# Patient Record
Sex: FEMALE | Race: WHITE | URBAN - METROPOLITAN AREA
[De-identification: names, ages, dates, MRNs, and addresses within clinical notes are randomized per-mention and may not be internally consistent; named-entity substitution may affect disease eponyms.]

---

## 2020-06-18 ENCOUNTER — OFFICE VISIT (OUTPATIENT)
Dept: URBAN - METROPOLITAN AREA CLINIC 115 | Facility: CLINIC | Age: 65
End: 2020-06-18
Payer: COMMERCIAL

## 2020-06-18 DIAGNOSIS — R19.7 DIARRHEA: ICD-10-CM

## 2020-06-18 DIAGNOSIS — Z86.010 PERSONAL HISTORY OF COLONIC POLYPS: ICD-10-CM

## 2020-06-18 DIAGNOSIS — K52.832 LYMPHOCYTIC COLITIS: ICD-10-CM

## 2020-06-18 PROCEDURE — 1036F TOBACCO NON-USER: CPT | Performed by: INTERNAL MEDICINE

## 2020-06-18 PROCEDURE — G8427 DOCREV CUR MEDS BY ELIG CLIN: HCPCS | Performed by: INTERNAL MEDICINE

## 2020-06-18 PROCEDURE — 99214 OFFICE O/P EST MOD 30 MIN: CPT | Performed by: INTERNAL MEDICINE

## 2020-06-18 PROCEDURE — G8420 CALC BMI NORM PARAMETERS: HCPCS | Performed by: INTERNAL MEDICINE

## 2020-06-18 RX ORDER — MESALAMINE 1.2 G/1
TAKE 1 TABLET BY MOUTH EVERY DAY TABLET, DELAYED RELEASE ORAL
Qty: 90 | Refills: 3 | Status: ACTIVE | COMMUNITY
Start: 2019-02-21

## 2020-06-18 RX ORDER — BALSALAZIDE DISODIUM 750 MG/1
CAPSULE ORAL
Qty: 0 | Refills: 0 | Status: ACTIVE | COMMUNITY
Start: 1900-01-01

## 2020-06-18 RX ORDER — DICYCLOMINE HYDROCHLORIDE 10 MG/1
1 TABLET CAPSULE ORAL THREE TIMES A DAY
Qty: 90 | Refills: 1 | OUTPATIENT
Start: 2020-06-18 | End: 2020-08-17

## 2020-06-18 RX ORDER — FLUTICASONE PROPIONATE 50 UG/1
SPRAY 1 - 2 SPRAYS (50 - 100 MCG) IN EACH NOSTRIL BY INTRANASAL ROUTE ONCE DAILY SPRAY, METERED NASAL 1
Qty: 1 | Refills: 0 | Status: ACTIVE | COMMUNITY
Start: 1900-01-01

## 2020-06-18 NOTE — HPI-TODAY'S VISIT:
Patient has occasional abdominal cramping. She continues to take Balsalazide, and Bentyl 3 tablets a day. She has been off of Budesonide for the past 1 year. She states soft cheeses trigger her symptoms. Denies any diarrhea or rectal bleeding.

## 2020-06-19 LAB
A/G RATIO: 2
ALBUMIN: 4.7
ALKALINE PHOSPHATASE: 68
ALT (SGPT): 11
AST (SGOT): 18
BILIRUBIN, TOTAL: 0.3
BUN/CREATININE RATIO: 18
BUN: 16
CALCIUM: 9.6
CARBON DIOXIDE, TOTAL: 25
CHLORIDE: 106
CREATININE: 0.9
EGFR IF AFRICN AM: 78
EGFR IF NONAFRICN AM: 68
GLOBULIN, TOTAL: 2.3
GLUCOSE: 91
HEMATOCRIT: 38.9
HEMOGLOBIN: 12.8
MCH: 29.6
MCHC: 32.9
MCV: 90
NRBC: (no result)
PLATELETS: 271
POTASSIUM: 5.4
PROTEIN, TOTAL: 7
RBC: 4.33
RDW: 14.5
SODIUM: 143
WBC: 4.9

## 2020-09-17 ENCOUNTER — WEB ENCOUNTER (OUTPATIENT)
Dept: URBAN - METROPOLITAN AREA CLINIC 115 | Facility: CLINIC | Age: 65
End: 2020-09-17

## 2020-09-17 ENCOUNTER — OFFICE VISIT (OUTPATIENT)
Dept: URBAN - METROPOLITAN AREA CLINIC 115 | Facility: CLINIC | Age: 65
End: 2020-09-17
Payer: COMMERCIAL

## 2020-09-17 DIAGNOSIS — Z86.010 PERSONAL HISTORY OF COLONIC POLYPS: ICD-10-CM

## 2020-09-17 DIAGNOSIS — R10.84 ABDOMINAL PAIN, GENERALIZED: ICD-10-CM

## 2020-09-17 DIAGNOSIS — R19.7 DIARRHEA: ICD-10-CM

## 2020-09-17 DIAGNOSIS — K52.832 LYMPHOCYTIC COLITIS: ICD-10-CM

## 2020-09-17 PROCEDURE — 1036F TOBACCO NON-USER: CPT | Performed by: INTERNAL MEDICINE

## 2020-09-17 PROCEDURE — G8420 CALC BMI NORM PARAMETERS: HCPCS | Performed by: INTERNAL MEDICINE

## 2020-09-17 PROCEDURE — 99214 OFFICE O/P EST MOD 30 MIN: CPT | Performed by: INTERNAL MEDICINE

## 2020-09-17 PROCEDURE — G8427 DOCREV CUR MEDS BY ELIG CLIN: HCPCS | Performed by: INTERNAL MEDICINE

## 2020-09-17 PROCEDURE — 3017F COLORECTAL CA SCREEN DOC REV: CPT | Performed by: INTERNAL MEDICINE

## 2020-09-17 RX ORDER — DICYCLOMINE HYDROCHLORIDE 10 MG/1
1 TABLET CAPSULE ORAL THREE TIMES A DAY
Qty: 270 TABLET | Refills: 1 | OUTPATIENT
Start: 2020-09-17 | End: 2021-03-16

## 2020-09-17 RX ORDER — MESALAMINE 1.2 G/1
TAKE 1 TABLET BY MOUTH EVERY DAY TABLET, DELAYED RELEASE ORAL
Qty: 90 | Refills: 3 | Status: ACTIVE | COMMUNITY
Start: 2019-02-21

## 2020-09-17 RX ORDER — BALSALAZIDE DISODIUM 750 MG/1
1 CAPSULE CAPSULE ORAL THREE TIMES A DAY
Qty: 270 CAPSULE | Refills: 2 | OUTPATIENT
Start: 2020-09-17 | End: 2021-06-14

## 2020-09-17 RX ORDER — BALSALAZIDE DISODIUM 750 MG/1
CAPSULE ORAL
Qty: 0 | Refills: 0 | Status: ACTIVE | COMMUNITY
Start: 1900-01-01

## 2020-09-17 RX ORDER — FLUTICASONE PROPIONATE 50 UG/1
SPRAY 1 - 2 SPRAYS (50 - 100 MCG) IN EACH NOSTRIL BY INTRANASAL ROUTE ONCE DAILY SPRAY, METERED NASAL 1
Qty: 1 | Refills: 0 | Status: ACTIVE | COMMUNITY
Start: 1900-01-01

## 2020-09-17 NOTE — HPI-TODAY'S VISIT:
06/18/2020 Patient has occasional abdominal cramping. She continues to take Balsalazide, and Bentyl 3 tablets a day. She has been off of Budesonide for the past 1 year. She states soft cheeses trigger her symptoms. Denies any diarrhea or rectal bleeding.  09/17/2020  Colonoscopy on 09/27/2019 was normal. Biopsies showed no signs of lymphocytic colitis.  Patient presents for a follow up office visit. Patient states she was doing well the past 3 months. She had an episode of diarrhea on Monday. She has been consuming milk and dairy products very minimally. She reports improvement on Bentyl, with occasional aching feeling in the stomach. She continued to take Balsalazide, and just ran out yesterday.

## 2020-09-17 NOTE — HPI-OTHER HISTORIES
The patient is a 63 year old /White female, who presents on referral from Dakota Brice MD, for a colonoscopy. The patient had a previous colonoscopy approximately 5 years ago. The patient reports the risk factors for colon cancer of a history of "colon polyps".  There is no recent history of rectal bleeding. The patient also reports abdominal pain and bloating. The abdominal pain has been present for months and is described as mild in severity. It is poorly localized. The pain is described as intermittent and crampy and non-radiating.     03/17/2016 She c/o gas and cramping. She was prescribed bentyl 10mg po tid by PCP. Previous colonoscopy showed 5 adenemous polyps in the colon. No heartburn/acid reflux. Takes calcium and multivitamins. No gi bleeding, fever, chills, nausea. No jaundice of anemia. No FH of colon cancer or polyps.  1/5/17 c/o dirrhea from past 6 weeks. recently had left knee repalcement. also c/o stomach cramps. no rectal bleeding. she finished abx 2 weeks ago.  03/02/2017 Colonoscopy done on 04/25/16 was normal with internal hemorrhoids. Stool studies done on 01/06/17 were negative for infectious pathogens. Patient says her diarrhea has improved but is not completely resolved. She also complains of excess gas and gurgling noises in her stomach. Denies family history of colon polyps and NSAID use.  03/30/2017 Colonoscopy done on 03/16/17 was normal. Biopsies showed lymphocytic colitis. Patient says she has a history of taking pain medication due to knee replacement surgery. She says she is now having 2-3 loose bowel movements a day. No rectal bleeding. Symptoms improved on Levsin.  06/29/2017 Patient says her diarrhea has improved. She is currently on 9mg Entocort therapy qd. Denies NSAID use, abdominal pain, bloating and GI bleeding. No joint pains.  09/28/2017 Patient's diarrhea has improved with occasional diarrhea which she believes may be from dairy products. She is currently on two capsules of Entocort qd and Lialda. Denies joint/back pains and NSAID use. No rectal bleeding.  02/21/18 c/o diarrhea, sx not under , having diarhea upto 3 times a day. no gi bleeeding. was on clindamycin. no weight loss.  Patient states she has had a flare up.States that symptoms were exacerbated after she had some goat cheese. She is still taking Lialda. Patient denies any abdominal pain.  Aymptoms not under control, having diarrhea now. No GI bleeding.  03/22/2018 Patient is feeling better. Diarrhea has improved. No melena or abdominal pain. No bleeding, or joint pains. Patient is still on Lialda one a day and Entocort 9 mg. Patient denies abdominal pain. Patient just had a physical. PCP  is Mitzy Alcazar.  05/17/18 Denies loose bowels and diarrhea, abdominal pain, bloating, and rectal bleeding. States she's taking Entocort po bid. Patient denies taking aspirin. Denies family history of Celiac disease. Symptoms under control.  08/16/2018 Colonoscopy showed 1 polyp in the rectum and small hemorrhoids. Biopsy of the polyp was benign.  No loose bowels, states she is still taking the medication. States she is taking Mesalamine po qd, and Budesonide. Denies family history of Celiac disease. States cheese exacerbates her symptoms. Patient does not eat much bread. Symptoms improved, denies abdominal pain.  12/20/18 States symptoms improved on medication. Currently taking Entocort. Patient is not on gluten free diet. States she has been taking Lialda daily po qd. No family history of Crohn's or UC. No melena or hematochezia.  03/07/2019 Patient states she was doing better, but then went off the medication and symptoms started again, not under control. She is taking mesalamine once a day. She states mesalamine alone without Budesonide, does not help her symptoms. Still complains of diarrhea. States Budesonide helps her symptoms but it affects her skin. She denies any family history of colitis, Crohn's or Celiac disease. She is negative for Celiac disease. Denies any melena or hematochezia. She has not changed eating habits. No joints or back pain. She deneis waking up with red eyes.  5/23/2019 Patient denies any more issues with diarrhea. She is currently on Budesonide and Balsalazide. She denies any abdominal pain and bloating. She has had skin breaking due to Budesonide. She states she takes some milk and dairy products. She has noticed that blue cheese and feta aggravate her symptoms. She denies any melena or hematochezia. Symptoms under control now, diarrhea improved.  8/22/2019 Labs done in 11/2018 showed TTG negative.  Patient states she is feeling better, symptoms improved. She took Budesonide and stopped it in June. She states she is currently on Balasalazide po tid. She denies any NSAIDs use. She denies consumption of diet drinks. She states she has occasional flare ups of diarrhea with aching lower abdominal pain. She states the pain is usually random. Denies any melena or hematochezia. Denies any rectal bleeding.  10/17/2019 Colonoscopy done on 09/27/2019 showed normal mucosa, internal hemorrhoids noted.  Patient states she is doing well. No symptoms now, symptoms improved. She is currently on Balsalazide po tid, started taking it recently. She states she is having knee replacement in December. Denies any heartburn or acid reflux. Denies any melena or hematochezia.

## 2020-09-18 LAB
A/G RATIO: 1.8
ALBUMIN: 4.7
ALKALINE PHOSPHATASE: 65
ALT (SGPT): 16
AST (SGOT): 23
BILIRUBIN, TOTAL: 0.3
BUN/CREATININE RATIO: 18
BUN: 18
CALCIUM: 10
CARBON DIOXIDE, TOTAL: 25
CHLORIDE: 101
CREATININE: 1.01
EGFR IF AFRICN AM: 68
EGFR IF NONAFRICN AM: 59
GLOBULIN, TOTAL: 2.6
GLUCOSE: 93
HEMATOCRIT: 41.7
HEMOGLOBIN: 13.6
MCH: 29.4
MCHC: 32.6
MCV: 90
NRBC: (no result)
PLATELETS: 310
POTASSIUM: 5.1
PROTEIN, TOTAL: 7.3
RBC: 4.63
RDW: 13.6
SODIUM: 142
WBC: 4.8

## 2021-01-04 ENCOUNTER — WEB ENCOUNTER (OUTPATIENT)
Dept: URBAN - METROPOLITAN AREA CLINIC 115 | Facility: CLINIC | Age: 66
End: 2021-01-04

## 2021-01-11 ENCOUNTER — TELEPHONE ENCOUNTER (OUTPATIENT)
Dept: URBAN - METROPOLITAN AREA CLINIC 82 | Facility: CLINIC | Age: 66
End: 2021-01-11

## 2021-01-11 RX ORDER — BUDESONIDE 3 MG/1
1 CAPSULES CAPSULE ORAL THREE TIMES A DAY
Qty: 270 CAPSULE | Refills: 1 | OUTPATIENT
Start: 2021-01-11 | End: 2021-07-10

## 2021-01-21 ENCOUNTER — OFFICE VISIT (OUTPATIENT)
Dept: URBAN - METROPOLITAN AREA CLINIC 115 | Facility: CLINIC | Age: 66
End: 2021-01-21
Payer: COMMERCIAL

## 2021-01-21 DIAGNOSIS — Z86.010 PERSONAL HISTORY OF COLONIC POLYPS: ICD-10-CM

## 2021-01-21 DIAGNOSIS — K52.832 LYMPHOCYTIC COLITIS: ICD-10-CM

## 2021-01-21 DIAGNOSIS — R19.7 DIARRHEA: ICD-10-CM

## 2021-01-21 DIAGNOSIS — R10.84 ABDOMINAL PAIN, GENERALIZED: ICD-10-CM

## 2021-01-21 PROCEDURE — G9903 PT SCRN TBCO ID AS NON USER: HCPCS | Performed by: INTERNAL MEDICINE

## 2021-01-21 PROCEDURE — G8417 CALC BMI ABV UP PARAM F/U: HCPCS | Performed by: INTERNAL MEDICINE

## 2021-01-21 PROCEDURE — 99214 OFFICE O/P EST MOD 30 MIN: CPT | Performed by: INTERNAL MEDICINE

## 2021-01-21 PROCEDURE — G8427 DOCREV CUR MEDS BY ELIG CLIN: HCPCS | Performed by: INTERNAL MEDICINE

## 2021-01-21 RX ORDER — BALSALAZIDE DISODIUM 750 MG/1
1 CAPSULE CAPSULE ORAL THREE TIMES A DAY
Qty: 270 CAPSULE | Refills: 2 | COMMUNITY
Start: 2020-09-17 | End: 2021-06-14

## 2021-01-21 RX ORDER — BUDESONIDE 3 MG/1
1 CAPSULES CAPSULE ORAL THREE TIMES A DAY
Qty: 270 CAPSULE | Refills: 1 | COMMUNITY
Start: 2021-01-11 | End: 2021-07-10

## 2021-01-21 RX ORDER — MESALAMINE 1.2 G/1
TAKE 1 TABLET BY MOUTH EVERY DAY TABLET, DELAYED RELEASE ORAL
Qty: 90 | Refills: 3 | COMMUNITY
Start: 2019-02-21

## 2021-01-21 RX ORDER — BALSALAZIDE DISODIUM 750 MG/1
1 CAPSULE CAPSULE ORAL THREE TIMES A DAY
Qty: 270 CAPSULE | Refills: 2 | OUTPATIENT
Start: 2021-01-21 | End: 2021-10-18

## 2021-01-21 RX ORDER — DICYCLOMINE HYDROCHLORIDE 10 MG/1
1 TABLET CAPSULE ORAL THREE TIMES A DAY
Qty: 270 TABLET | Refills: 1 | COMMUNITY
Start: 2020-09-17 | End: 2021-03-16

## 2021-01-21 RX ORDER — BALSALAZIDE DISODIUM 750 MG/1
CAPSULE ORAL
Qty: 0 | Refills: 0 | COMMUNITY
Start: 1900-01-01

## 2021-01-21 RX ORDER — FLUTICASONE PROPIONATE 50 UG/1
SPRAY 1 - 2 SPRAYS (50 - 100 MCG) IN EACH NOSTRIL BY INTRANASAL ROUTE ONCE DAILY SPRAY, METERED NASAL 1
Qty: 1 | Refills: 0 | COMMUNITY
Start: 1900-01-01

## 2021-01-21 NOTE — HPI-TODAY'S VISIT:
2020 Patient has occasional abdominal cramping. She continues to take Balsalazide, and Bentyl 3 tablets a day. She has been off of Budesonide for the past 1 year. She states soft cheeses trigger her symptoms. Denies any diarrhea or rectal bleeding.  2020  Colonoscopy on 2019 was normal. Biopsies showed no signs of lymphocytic colitis.  Patient presents for a follow up office visit. Patient states she was doing well the past 3 months. She had an episode of diarrhea on Monday. She has been consuming milk and dairy products very minimally. She reports improvement on Bentyl, with occasional aching feeling in the stomach. She continued to take Balsalazide, and just ran out yesterday.   2021 Patient presents for a f/u office visit. She says her diarrhea and other symptoms have improved significantly, back to normal. Her symptoms subsided without steroid medication, which she did not take because it was 6 months . She still takes balsalazide 3 times a day, has been taking it for about over a year now. She stopped entocort about 2 years ago. She says the cause of her recent flare up was unknown, possibly stress or food. Her  was recently diagnosed with prostate cancer which contributed to her stress at the time. Denies heartburn or acid reflux symptoms. She mostly avoids milk and dairy products, she is aware of which foods cause her irritation. Entocort caused her skin tears and discoloration in her leg previously, improved since she stopped.

## 2021-03-18 ENCOUNTER — OFFICE VISIT (OUTPATIENT)
Dept: URBAN - METROPOLITAN AREA CLINIC 115 | Facility: CLINIC | Age: 66
End: 2021-03-18

## 2021-07-28 ENCOUNTER — WEB ENCOUNTER (OUTPATIENT)
Dept: URBAN - METROPOLITAN AREA CLINIC 82 | Facility: CLINIC | Age: 66
End: 2021-07-28

## 2021-07-29 ENCOUNTER — OFFICE VISIT (OUTPATIENT)
Dept: URBAN - METROPOLITAN AREA CLINIC 115 | Facility: CLINIC | Age: 66
End: 2021-07-29
Payer: COMMERCIAL

## 2021-07-29 DIAGNOSIS — R19.7 DIARRHEA: ICD-10-CM

## 2021-07-29 DIAGNOSIS — Z86.010 PERSONAL HISTORY OF COLONIC POLYPS: ICD-10-CM

## 2021-07-29 DIAGNOSIS — K52.832 LYMPHOCYTIC COLITIS: ICD-10-CM

## 2021-07-29 DIAGNOSIS — R10.84 ABDOMINAL PAIN, GENERALIZED: ICD-10-CM

## 2021-07-29 PROCEDURE — 99214 OFFICE O/P EST MOD 30 MIN: CPT | Performed by: INTERNAL MEDICINE

## 2021-07-29 PROCEDURE — G8417 CALC BMI ABV UP PARAM F/U: HCPCS | Performed by: INTERNAL MEDICINE

## 2021-07-29 PROCEDURE — G9903 PT SCRN TBCO ID AS NON USER: HCPCS | Performed by: INTERNAL MEDICINE

## 2021-07-29 PROCEDURE — G8427 DOCREV CUR MEDS BY ELIG CLIN: HCPCS | Performed by: INTERNAL MEDICINE

## 2021-07-29 RX ORDER — MESALAMINE 1.2 G/1
TAKE 1 TABLET BY MOUTH EVERY DAY TABLET, DELAYED RELEASE ORAL
Qty: 90 | Refills: 3 | Status: ACTIVE | COMMUNITY
Start: 2019-02-21

## 2021-07-29 RX ORDER — BALSALAZIDE DISODIUM 750 MG/1
CAPSULE ORAL
Qty: 0 | Refills: 0 | Status: ACTIVE | COMMUNITY
Start: 1900-01-01

## 2021-07-29 RX ORDER — FLUTICASONE PROPIONATE 50 UG/1
SPRAY 1 - 2 SPRAYS (50 - 100 MCG) IN EACH NOSTRIL BY INTRANASAL ROUTE ONCE DAILY SPRAY, METERED NASAL 1
Qty: 1 | Refills: 0 | Status: ACTIVE | COMMUNITY
Start: 1900-01-01

## 2021-07-29 RX ORDER — BALSALAZIDE DISODIUM 750 MG/1
1 CAPSULE CAPSULE ORAL THREE TIMES A DAY
Qty: 270 CAPSULE | Refills: 2 | Status: ACTIVE | COMMUNITY
Start: 2021-01-21 | End: 2021-10-18

## 2021-07-29 NOTE — HPI-TODAY'S VISIT:
2020 Patient has occasional abdominal cramping. She continues to take Balsalazide, and Bentyl 3 tablets a day. She has been off of Budesonide for the past 1 year. She states soft cheeses trigger her symptoms. Denies any diarrhea or rectal bleeding.  2020  Colonoscopy on 2019 was normal. Biopsies showed no signs of lymphocytic colitis.  Patient presents for a follow up office visit. Patient states she was doing well the past 3 months. She had an episode of diarrhea on Monday. She has been consuming milk and dairy products very minimally. She reports improvement on Bentyl, with occasional aching feeling in the stomach. She continued to take Balsalazide, and just ran out yesterday.   2021 Patient presents for a f/u office visit. She says her diarrhea and other symptoms have improved significantly, back to normal. Her symptoms subsided without steroid medication, which she did not take because it was 6 months . She still takes balsalazide 3 times a day, has been taking it for about over a year now. She stopped entocort about 2 years ago. She says the cause of her recent flare up was unknown, possibly stress or food. Her  was recently diagnosed with prostate cancer which contributed to her stress at the time. Denies heartburn or acid reflux symptoms. She mostly avoids milk and dairy products, she is aware of which foods cause her irritation. Entocort caused her skin tears and discoloration in her leg previously, improved since she stopped.   2021 Patient presents for a follow up office visit. Patient reports her diarrhea occurs infrequently now, often triggered by salad and dairy products. She currently takes Balsalazide 750mg twice a day and Dicyclomine as needed. She is not using Entocort. Overall, she is doing well.

## 2021-07-30 LAB
A/G RATIO: 1.8
ALBUMIN: 4.6
ALKALINE PHOSPHATASE: 71
ALT (SGPT): 16
AST (SGOT): 23
BASO (ABSOLUTE): 0.1
BASOS: 1
BILIRUBIN, TOTAL: 0.3
BUN/CREATININE RATIO: 21
BUN: 19
CALCIUM: 10.1
CARBON DIOXIDE, TOTAL: 25
CHLORIDE: 103
CREATININE: 0.91
EGFR IF AFRICN AM: 77
EGFR IF NONAFRICN AM: 66
EOS (ABSOLUTE): 0.3
EOS: 5
GLOBULIN, TOTAL: 2.5
GLUCOSE: 91
HEMATOCRIT: 42.6
HEMATOLOGY COMMENTS:: (no result)
HEMOGLOBIN: 13.7
IMMATURE CELLS: (no result)
IMMATURE GRANS (ABS): 0
IMMATURE GRANULOCYTES: 1
LYMPHS (ABSOLUTE): 1.9
LYMPHS: 33
MCH: 30.4
MCHC: 32.2
MCV: 95
MONOCYTES(ABSOLUTE): 0.6
MONOCYTES: 9
NEUTROPHILS (ABSOLUTE): 3.1
NEUTROPHILS: 51
NRBC: (no result)
PLATELETS: 307
POTASSIUM: 5.8
PROTEIN, TOTAL: 7.1
RBC: 4.5
RDW: 13
SODIUM: 141
WBC: 5.9

## 2021-08-05 ENCOUNTER — TELEPHONE ENCOUNTER (OUTPATIENT)
Dept: URBAN - METROPOLITAN AREA CLINIC 23 | Facility: CLINIC | Age: 66
End: 2021-08-05

## 2021-08-05 RX ORDER — BALSALAZIDE DISODIUM 750 MG/1
1 CAPSULE CAPSULE ORAL THREE TIMES A DAY
Qty: 270 CAPSULE | Refills: 2 | OUTPATIENT
Start: 2021-08-05 | End: 2022-05-02

## 2021-10-28 ENCOUNTER — WEB ENCOUNTER (OUTPATIENT)
Dept: URBAN - METROPOLITAN AREA CLINIC 115 | Facility: CLINIC | Age: 66
End: 2021-10-28

## 2021-10-28 RX ORDER — DICYCLOMINE HYDROCHLORIDE 10 MG/1
1 TABLET CAPSULE ORAL THREE TIMES A DAY
Qty: 270 TABLET | Refills: 1 | OUTPATIENT
Start: 2021-10-28 | End: 2022-04-26

## 2021-11-04 ENCOUNTER — P2P PATIENT RECORD (OUTPATIENT)
Age: 66
End: 2021-11-04

## 2022-01-14 ENCOUNTER — P2P PATIENT RECORD (OUTPATIENT)
Age: 67
End: 2022-01-14

## 2022-01-20 ENCOUNTER — OFFICE VISIT (OUTPATIENT)
Dept: URBAN - METROPOLITAN AREA CLINIC 115 | Facility: CLINIC | Age: 67
End: 2022-01-20
Payer: COMMERCIAL

## 2022-01-20 DIAGNOSIS — R19.7 DIARRHEA: ICD-10-CM

## 2022-01-20 DIAGNOSIS — R10.84 ABDOMINAL PAIN, GENERALIZED: ICD-10-CM

## 2022-01-20 DIAGNOSIS — Z86.010 PERSONAL HISTORY OF COLONIC POLYPS: ICD-10-CM

## 2022-01-20 DIAGNOSIS — K52.832 LYMPHOCYTIC COLITIS: ICD-10-CM

## 2022-01-20 PROCEDURE — G9903 PT SCRN TBCO ID AS NON USER: HCPCS | Performed by: INTERNAL MEDICINE

## 2022-01-20 PROCEDURE — 99214 OFFICE O/P EST MOD 30 MIN: CPT | Performed by: INTERNAL MEDICINE

## 2022-01-20 PROCEDURE — G8427 DOCREV CUR MEDS BY ELIG CLIN: HCPCS | Performed by: INTERNAL MEDICINE

## 2022-01-20 PROCEDURE — G8417 CALC BMI ABV UP PARAM F/U: HCPCS | Performed by: INTERNAL MEDICINE

## 2022-01-20 RX ORDER — DICYCLOMINE HYDROCHLORIDE 10 MG/1
1 TABLET CAPSULE ORAL THREE TIMES A DAY
Qty: 270 TABLET | Refills: 1 | Status: ACTIVE | COMMUNITY
Start: 2021-10-28 | End: 2022-04-26

## 2022-01-20 RX ORDER — BALSALAZIDE DISODIUM 750 MG/1
1 CAPSULE CAPSULE ORAL THREE TIMES A DAY
Qty: 270 CAPSULE | Refills: 2 | Status: ACTIVE | COMMUNITY
Start: 2021-08-05 | End: 2022-05-02

## 2022-01-20 RX ORDER — MESALAMINE 1.2 G/1
TAKE 1 TABLET BY MOUTH EVERY DAY TABLET, DELAYED RELEASE ORAL
Qty: 90 | Refills: 3 | Status: ACTIVE | COMMUNITY
Start: 2019-02-21

## 2022-01-20 RX ORDER — BALSALAZIDE DISODIUM 750 MG/1
CAPSULE ORAL
Qty: 0 | Refills: 0 | Status: ACTIVE | COMMUNITY
Start: 1900-01-01

## 2022-01-20 RX ORDER — FLUTICASONE PROPIONATE 50 UG/1
SPRAY 1 - 2 SPRAYS (50 - 100 MCG) IN EACH NOSTRIL BY INTRANASAL ROUTE ONCE DAILY SPRAY, METERED NASAL 1
Qty: 1 | Refills: 0 | Status: ACTIVE | COMMUNITY
Start: 1900-01-01

## 2022-01-20 NOTE — HPI-TODAY'S VISIT:
2020 Patient has occasional abdominal cramping. She continues to take Balsalazide, and Bentyl 3 tablets a day. She has been off of Budesonide for the past 1 year. She states soft cheeses trigger her symptoms. Denies any diarrhea or rectal bleeding.  2020  Colonoscopy on 2019 was normal. Biopsies showed no signs of lymphocytic colitis.  Patient presents for a follow up office visit. Patient states she was doing well the past 3 months. She had an episode of diarrhea on Monday. She has been consuming milk and dairy products very minimally. She reports improvement on Bentyl, with occasional aching feeling in the stomach. She continued to take Balsalazide, and just ran out yesterday.   2021 Patient presents for a f/u office visit. She says her diarrhea and other symptoms have improved significantly, back to normal. Her symptoms subsided without steroid medication, which she did not take because it was 6 months . She still takes balsalazide 3 times a day, has been taking it for about over a year now. She stopped entocort about 2 years ago. She says the cause of her recent flare up was unknown, possibly stress or food. Her  was recently diagnosed with prostate cancer which contributed to her stress at the time. Denies heartburn or acid reflux symptoms. She mostly avoids milk and dairy products, she is aware of which foods cause her irritation. Entocort caused her skin tears and discoloration in her leg previously, improved since she stopped.   2021 Patient presents for a follow up office visit. Patient reports her diarrhea occurs infrequently now, often triggered by salad and dairy products. She currently takes Balsalazide 750mg twice a day and Dicyclomine as needed. She is not using Entocort. Overall, she is doing well.   2022 Labs from 2021 were normal. Patient reports she is doing well on Balsalazide 750mg twice a day. Diarrhea only presents occasionally now. She has been off of Entocort for over 1.5 years. She admits joint pain in her hands and knee from arthritis. She denies any heartburn or acid reflux.

## 2022-01-21 LAB
A/G RATIO: 2
ALBUMIN: 4.4
ALKALINE PHOSPHATASE: 73
ALT (SGPT): 11
AST (SGOT): 15
BASO (ABSOLUTE): 0
BASOS: 0
BILIRUBIN, TOTAL: 0.3
BUN/CREATININE RATIO: 17
BUN: 14
CALCIUM: 9.3
CARBON DIOXIDE, TOTAL: 26
CHLORIDE: 101
CREATININE: 0.84
EGFR IF AFRICN AM: 84
EGFR IF NONAFRICN AM: 73
EOS (ABSOLUTE): 0.4
EOS: 5
GLOBULIN, TOTAL: 2.2
GLUCOSE: 83
HEMATOCRIT: 41.8
HEMATOLOGY COMMENTS:: (no result)
HEMOGLOBIN: 13.5
IMMATURE CELLS: (no result)
IMMATURE GRANS (ABS): 0
IMMATURE GRANULOCYTES: 1
LYMPHS (ABSOLUTE): 1.9
LYMPHS: 25
MCH: 29.5
MCHC: 32.3
MCV: 91
MONOCYTES(ABSOLUTE): 0.8
MONOCYTES: 10
NEUTROPHILS (ABSOLUTE): 4.4
NEUTROPHILS: 59
NRBC: (no result)
PLATELETS: 299
POTASSIUM: 5.1
PROTEIN, TOTAL: 6.6
RBC: 4.58
RDW: 12.4
SODIUM: 141
WBC: 7.5

## 2022-05-02 ENCOUNTER — WEB ENCOUNTER (OUTPATIENT)
Dept: URBAN - METROPOLITAN AREA CLINIC 115 | Facility: CLINIC | Age: 67
End: 2022-05-02

## 2022-05-05 ENCOUNTER — WEB ENCOUNTER (OUTPATIENT)
Dept: URBAN - METROPOLITAN AREA CLINIC 115 | Facility: CLINIC | Age: 67
End: 2022-05-05

## 2022-05-05 ENCOUNTER — OFFICE VISIT (OUTPATIENT)
Dept: URBAN - METROPOLITAN AREA CLINIC 115 | Facility: CLINIC | Age: 67
End: 2022-05-05
Payer: COMMERCIAL

## 2022-05-05 DIAGNOSIS — R19.7 DIARRHEA: ICD-10-CM

## 2022-05-05 DIAGNOSIS — Z86.010 PERSONAL HISTORY OF COLONIC POLYPS: ICD-10-CM

## 2022-05-05 DIAGNOSIS — R10.84 ABDOMINAL PAIN, GENERALIZED: ICD-10-CM

## 2022-05-05 DIAGNOSIS — K52.832 LYMPHOCYTIC COLITIS: ICD-10-CM

## 2022-05-05 PROCEDURE — G8417 CALC BMI ABV UP PARAM F/U: HCPCS | Performed by: INTERNAL MEDICINE

## 2022-05-05 PROCEDURE — G9903 PT SCRN TBCO ID AS NON USER: HCPCS | Performed by: INTERNAL MEDICINE

## 2022-05-05 PROCEDURE — G8427 DOCREV CUR MEDS BY ELIG CLIN: HCPCS | Performed by: INTERNAL MEDICINE

## 2022-05-05 PROCEDURE — 99214 OFFICE O/P EST MOD 30 MIN: CPT | Performed by: INTERNAL MEDICINE

## 2022-05-05 RX ORDER — MESALAMINE 1.2 G/1
TAKE 1 TABLET BY MOUTH EVERY DAY TABLET, DELAYED RELEASE ORAL
Qty: 90 | Refills: 3 | Status: ACTIVE | COMMUNITY
Start: 2019-02-21

## 2022-05-05 RX ORDER — FLUTICASONE PROPIONATE 50 UG/1
SPRAY 1 - 2 SPRAYS (50 - 100 MCG) IN EACH NOSTRIL BY INTRANASAL ROUTE ONCE DAILY SPRAY, METERED NASAL 1
Qty: 1 | Refills: 0 | Status: ACTIVE | COMMUNITY
Start: 1900-01-01

## 2022-05-05 RX ORDER — HYOSCYAMINE SULFATE 0.12 MG/1
1 TABLET AS NEEDED TABLET ORAL
Qty: 90 | Refills: 1 | OUTPATIENT
Start: 2022-05-05 | End: 2022-07-04

## 2022-05-05 RX ORDER — BALSALAZIDE DISODIUM 750 MG/1
CAPSULE ORAL
Qty: 0 | Refills: 0 | Status: ACTIVE | COMMUNITY
Start: 1900-01-01

## 2022-05-05 RX ORDER — AZATHIOPRINE 50 MG/1
AS DIRECTED TABLET ORAL ONCE A DAY
Qty: 90 | Refills: 1 | OUTPATIENT

## 2022-05-05 NOTE — HPI-TODAY'S VISIT:
2020 Patient has occasional abdominal cramping. She continues to take Balsalazide, and Bentyl 3 tablets a day. She has been off of Budesonide for the past 1 year. She states soft cheeses trigger her symptoms. Denies any diarrhea or rectal bleeding.  2020  Colonoscopy on 2019 was normal. Biopsies showed no signs of lymphocytic colitis. Patient presents for a follow up office visit. Patient states she was doing well the past 3 months. She had an episode of diarrhea on Monday. She has been consuming milk and dairy products very minimally. She reports improvement on Bentyl, with occasional aching feeling in the stomach. She continued to take Balsalazide, and just ran out yesterday.   2021 Patient presents for a f/u office visit. She says her diarrhea and other symptoms have improved significantly, back to normal. Her symptoms subsided without steroid medication, which she did not take because it was 6 months . She still takes balsalazide 3 times a day, has been taking it for about over a year now. She stopped entocort about 2 years ago. She says the cause of her recent flare up was unknown, possibly stress or food. Her  was recently diagnosed with prostate cancer which contributed to her stress at the time. Denies heartburn or acid reflux symptoms. She mostly avoids milk and dairy products, she is aware of which foods cause her irritation. Entocort caused her skin tears and discoloration in her leg previously, improved since she stopped.   2021 Patient presents for a follow up office visit. Patient reports her diarrhea occurs infrequently now, often triggered by salad and dairy products. She currently takes Balsalazide 750mg twice a day and Dicyclomine as needed. She is not using Entocort. Overall, she is doing well.   2022 Labs from 2021 were normal. Patient reports she is doing well on Balsalazide 750mg twice a day. Diarrhea only presents occasionally now. She has been off of Entocort for over 1.5 years. She admits joint pain in her hands and knee from arthritis. She denies any heartburn or acid reflux.   2022 Patient presents for follow up. Labs on 2022 showed normal kidney function. Patient reports "aching" discomfort in lower abdomen and lower back for the past month. Back pain is newly onset. She had two episodes of nocturnal cramping, improved after having bowel movement. Diarrhea presents occasionally now, significantly improved on Budesonide. She takes Dicyclomine tid, Balsalazide 750mg bid. Labs with her PCP 1 month ago showed low vitamin B12. She has been taking tumeric, has not started vitamin B12 injections yet.

## 2022-05-14 ENCOUNTER — WEB ENCOUNTER (OUTPATIENT)
Dept: URBAN - METROPOLITAN AREA CLINIC 115 | Facility: CLINIC | Age: 67
End: 2022-05-14

## 2022-05-17 ENCOUNTER — TELEPHONE ENCOUNTER (OUTPATIENT)
Dept: URBAN - METROPOLITAN AREA CLINIC 115 | Facility: CLINIC | Age: 67
End: 2022-05-17

## 2022-05-17 RX ORDER — HYOSCYAMINE SULFATE 0.12 MG/1
1 TABLET AS NEEDED TABLET ORAL
Qty: 270 TABLET | Refills: 0

## 2022-05-17 RX ORDER — AZATHIOPRINE 50 MG/1
AS DIRECTED TABLET ORAL ONCE A DAY
Qty: 90 | Refills: 0
End: 2022-08-15

## 2022-06-08 ENCOUNTER — OFFICE VISIT (OUTPATIENT)
Dept: URBAN - METROPOLITAN AREA CLINIC 82 | Facility: CLINIC | Age: 67
End: 2022-06-08

## 2022-07-07 ENCOUNTER — LAB OUTSIDE AN ENCOUNTER (OUTPATIENT)
Dept: URBAN - METROPOLITAN AREA CLINIC 115 | Facility: CLINIC | Age: 67
End: 2022-07-07

## 2022-07-07 ENCOUNTER — OFFICE VISIT (OUTPATIENT)
Dept: URBAN - METROPOLITAN AREA CLINIC 115 | Facility: CLINIC | Age: 67
End: 2022-07-07
Payer: COMMERCIAL

## 2022-07-07 VITALS
BODY MASS INDEX: 32.98 KG/M2 | HEIGHT: 66 IN | TEMPERATURE: 97.2 F | DIASTOLIC BLOOD PRESSURE: 72 MMHG | HEART RATE: 82 BPM | RESPIRATION RATE: 18 BRPM | SYSTOLIC BLOOD PRESSURE: 120 MMHG | WEIGHT: 205.2 LBS

## 2022-07-07 DIAGNOSIS — Z86.010 PERSONAL HISTORY OF COLONIC POLYPS: ICD-10-CM

## 2022-07-07 DIAGNOSIS — R10.84 ABDOMINAL PAIN, GENERALIZED: ICD-10-CM

## 2022-07-07 DIAGNOSIS — R19.7 DIARRHEA: ICD-10-CM

## 2022-07-07 DIAGNOSIS — K52.832 LYMPHOCYTIC COLITIS: ICD-10-CM

## 2022-07-07 PROCEDURE — G8417 CALC BMI ABV UP PARAM F/U: HCPCS | Performed by: INTERNAL MEDICINE

## 2022-07-07 PROCEDURE — G9903 PT SCRN TBCO ID AS NON USER: HCPCS | Performed by: INTERNAL MEDICINE

## 2022-07-07 PROCEDURE — G8427 DOCREV CUR MEDS BY ELIG CLIN: HCPCS | Performed by: INTERNAL MEDICINE

## 2022-07-07 PROCEDURE — 99214 OFFICE O/P EST MOD 30 MIN: CPT | Performed by: INTERNAL MEDICINE

## 2022-07-07 RX ORDER — AZATHIOPRINE 50 MG/1
AS DIRECTED TABLET ORAL ONCE A DAY
Qty: 90 | Refills: 0 | Status: ACTIVE | COMMUNITY
End: 2022-08-15

## 2022-07-07 RX ORDER — MESALAMINE 1.2 G/1
TAKE 1 TABLET BY MOUTH EVERY DAY TABLET, DELAYED RELEASE ORAL
Qty: 90 | Refills: 3 | Status: ACTIVE | COMMUNITY
Start: 2019-02-21

## 2022-07-07 RX ORDER — BALSALAZIDE DISODIUM 750 MG/1
1 CAPSULE CAPSULE ORAL THREE TIMES A DAY
Qty: 270 CAPSULE | Refills: 2 | OUTPATIENT
Start: 2022-07-07 | End: 2023-04-03

## 2022-07-07 RX ORDER — FLUTICASONE PROPIONATE 50 UG/1
SPRAY 1 - 2 SPRAYS (50 - 100 MCG) IN EACH NOSTRIL BY INTRANASAL ROUTE ONCE DAILY SPRAY, METERED NASAL 1
Qty: 1 | Refills: 0 | Status: ACTIVE | COMMUNITY
Start: 1900-01-01

## 2022-07-07 RX ORDER — HYOSCYAMINE SULFATE 0.12 MG/1
1 TABLET AS NEEDED TABLET ORAL
Qty: 270 TABLET | Refills: 0 | Status: ACTIVE | COMMUNITY

## 2022-07-07 RX ORDER — BALSALAZIDE DISODIUM 750 MG/1
CAPSULE ORAL
Qty: 0 | Refills: 0 | Status: ACTIVE | COMMUNITY
Start: 1900-01-01

## 2022-07-07 NOTE — HPI-TODAY'S VISIT:
2020 Patient has occasional abdominal cramping. She continues to take Balsalazide, and Bentyl 3 tablets a day. She has been off of Budesonide for the past 1 year. She states soft cheeses trigger her symptoms. Denies any diarrhea or rectal bleeding.  2020  Colonoscopy on 2019 was normal. Biopsies showed no signs of lymphocytic colitis. Patient presents for a follow up office visit. Patient states she was doing well the past 3 months. She had an episode of diarrhea on Monday. She has been consuming milk and dairy products very minimally. She reports improvement on Bentyl, with occasional aching feeling in the stomach. She continued to take Balsalazide, and just ran out yesterday.   2021 Patient presents for a f/u office visit. She says her diarrhea and other symptoms have improved significantly, back to normal. Her symptoms subsided without steroid medication, which she did not take because it was 6 months . She still takes balsalazide 3 times a day, has been taking it for about over a year now. She stopped entocort about 2 years ago. She says the cause of her recent flare up was unknown, possibly stress or food. Her  was recently diagnosed with prostate cancer which contributed to her stress at the time. Denies heartburn or acid reflux symptoms. She mostly avoids milk and dairy products, she is aware of which foods cause her irritation. Entocort caused her skin tears and discoloration in her leg previously, improved since she stopped.   2021 Patient presents for a follow up office visit. Patient reports her diarrhea occurs infrequently now, often triggered by salad and dairy products. She currently takes Balsalazide 750mg twice a day and Dicyclomine as needed. She is not using Entocort. Overall, she is doing well.   2022 Labs from 2021 were normal. Patient reports she is doing well on Balsalazide 750mg twice a day. Diarrhea only presents occasionally now. She has been off of Entocort for over 1.5 years. She admits joint pain in her hands and knee from arthritis. She denies any heartburn or acid reflux.   2022 Patient presents for follow up. Labs on 2022 showed normal kidney function. Patient reports "aching" discomfort in lower abdomen and lower back for the past month. Back pain is newly onset. She had two episodes of nocturnal cramping, improved after having bowel movement. Diarrhea presents occasionally now, significantly improved on Budesonide. She takes Dicyclomine tid, Balsalazide 750mg bid. Labs with her PCP 1 month ago showed low vitamin B12. She has been taking tumeric, has not started vitamin B12 injections yet.  2022 Patient presents for a follow up for lower back and abdominal pain. Patient denies any significant symptoms and she states that she still takes hyoscymine and she will start azathioprine. Patient states that she is also still on the balsalazide. Patient denies any back pain and joint pains. She states that the only time that she has pain in her back is when she bends over and then straightens out. Yet, she states that she is over all feeling a lot better and her pain has improved.

## 2022-07-08 LAB
A/G RATIO: 1.9
ALBUMIN: 4.7
ALKALINE PHOSPHATASE: 77
ALT (SGPT): 11
AST (SGOT): 17
BASO (ABSOLUTE): 0
BASOS: 1
BILIRUBIN, TOTAL: 0.2
BUN/CREATININE RATIO: 17
BUN: 15
CALCIUM: 9.2
CARBON DIOXIDE, TOTAL: 24
CHLORIDE: 105
CREATININE: 0.89
EGFR: 71
EOS (ABSOLUTE): 0.4
EOS: 6
GLOBULIN, TOTAL: 2.5
GLUCOSE: 97
HEMATOCRIT: 41.9
HEMATOLOGY COMMENTS:: (no result)
HEMOGLOBIN: 13.3
IMMATURE CELLS: (no result)
IMMATURE GRANS (ABS): 0
IMMATURE GRANULOCYTES: 0
LYMPHS (ABSOLUTE): 2.1
LYMPHS: 34
MCH: 30
MCHC: 31.7
MCV: 95
MONOCYTES(ABSOLUTE): 0.5
MONOCYTES: 9
NEUTROPHILS (ABSOLUTE): 3.2
NEUTROPHILS: 50
NRBC: (no result)
PLATELETS: 262
POTASSIUM: 5.1
PROTEIN, TOTAL: 7.2
RBC: 4.43
RDW: 13.4
SODIUM: 141
WBC: 6.2

## 2022-08-04 ENCOUNTER — OFFICE VISIT (OUTPATIENT)
Dept: URBAN - METROPOLITAN AREA CLINIC 115 | Facility: CLINIC | Age: 67
End: 2022-08-04

## 2022-08-04 ENCOUNTER — TELEPHONE ENCOUNTER (OUTPATIENT)
Dept: URBAN - METROPOLITAN AREA CLINIC 115 | Facility: CLINIC | Age: 67
End: 2022-08-04

## 2022-08-04 RX ORDER — HYOSCYAMINE SULFATE 0.12 MG/1
1 TABLET AS NEEDED TABLET ORAL THREE TIMES A DAY
Qty: 270 TABLET | Refills: 1 | OUTPATIENT
Start: 2022-08-05 | End: 2023-01-31

## 2022-08-05 LAB
A/G RATIO: 1.9
ALBUMIN: 4.3
ALKALINE PHOSPHATASE: 60
ALT (SGPT): 11
AST (SGOT): 18
BILIRUBIN, TOTAL: 0.5
BUN/CREATININE RATIO: (no result)
BUN: 20
CALCIUM: 9
CARBON DIOXIDE, TOTAL: 26
CHLORIDE: 104
CREATININE: 0.96
EGFR: 65
GLOBULIN, TOTAL: 2.3
GLUCOSE: 102
POTASSIUM: 4.6
PROTEIN, TOTAL: 6.6
SODIUM: 137

## 2022-08-17 ENCOUNTER — WEB ENCOUNTER (OUTPATIENT)
Dept: URBAN - METROPOLITAN AREA CLINIC 115 | Facility: CLINIC | Age: 67
End: 2022-08-17

## 2022-10-13 ENCOUNTER — LAB OUTSIDE AN ENCOUNTER (OUTPATIENT)
Dept: URBAN - METROPOLITAN AREA CLINIC 115 | Facility: CLINIC | Age: 67
End: 2022-10-13

## 2022-10-13 ENCOUNTER — OFFICE VISIT (OUTPATIENT)
Dept: URBAN - METROPOLITAN AREA CLINIC 115 | Facility: CLINIC | Age: 67
End: 2022-10-13
Payer: COMMERCIAL

## 2022-10-13 VITALS
BODY MASS INDEX: 32.69 KG/M2 | SYSTOLIC BLOOD PRESSURE: 136 MMHG | TEMPERATURE: 97.5 F | HEART RATE: 80 BPM | HEIGHT: 66 IN | WEIGHT: 203.4 LBS | DIASTOLIC BLOOD PRESSURE: 82 MMHG

## 2022-10-13 DIAGNOSIS — R10.84 ABDOMINAL PAIN, GENERALIZED: ICD-10-CM

## 2022-10-13 DIAGNOSIS — Z86.010 PERSONAL HISTORY OF COLONIC POLYPS: ICD-10-CM

## 2022-10-13 DIAGNOSIS — R19.7 DIARRHEA: ICD-10-CM

## 2022-10-13 DIAGNOSIS — K52.832 LYMPHOCYTIC COLITIS: ICD-10-CM

## 2022-10-13 PROCEDURE — 99214 OFFICE O/P EST MOD 30 MIN: CPT | Performed by: INTERNAL MEDICINE

## 2022-10-13 PROCEDURE — G8417 CALC BMI ABV UP PARAM F/U: HCPCS | Performed by: INTERNAL MEDICINE

## 2022-10-13 PROCEDURE — G8427 DOCREV CUR MEDS BY ELIG CLIN: HCPCS | Performed by: INTERNAL MEDICINE

## 2022-10-13 PROCEDURE — G9903 PT SCRN TBCO ID AS NON USER: HCPCS | Performed by: INTERNAL MEDICINE

## 2022-10-13 RX ORDER — HYOSCYAMINE SULFATE 0.12 MG/1
1 TABLET AS NEEDED TABLET ORAL THREE TIMES A DAY
Qty: 270 TABLET | Refills: 1 | OUTPATIENT
Start: 2022-10-13 | End: 2023-04-11

## 2022-10-13 RX ORDER — SODIUM PICOSULFATE, MAGNESIUM OXIDE, AND ANHYDROUS CITRIC ACID 10; 3.5; 12 MG/160ML; G/160ML; G/160ML
160 ML LIQUID ORAL
Qty: 320 MILLILITER | Refills: 0 | OUTPATIENT
Start: 2022-10-13 | End: 2022-10-14

## 2022-10-13 RX ORDER — FLUTICASONE PROPIONATE 50 UG/1
SPRAY 1 - 2 SPRAYS (50 - 100 MCG) IN EACH NOSTRIL BY INTRANASAL ROUTE ONCE DAILY SPRAY, METERED NASAL 1
Qty: 1 | Refills: 0 | Status: ACTIVE | COMMUNITY
Start: 1900-01-01

## 2022-10-13 RX ORDER — BALSALAZIDE DISODIUM 750 MG/1
1 CAPSULE CAPSULE ORAL THREE TIMES A DAY
Qty: 270 CAPSULE | Refills: 2 | OUTPATIENT
Start: 2022-10-13 | End: 2023-07-10

## 2022-10-13 RX ORDER — BALSALAZIDE DISODIUM 750 MG/1
1 CAPSULE CAPSULE ORAL THREE TIMES A DAY
Qty: 270 CAPSULE | Refills: 2 | Status: ACTIVE | COMMUNITY
Start: 2022-07-07 | End: 2023-04-03

## 2022-10-13 RX ORDER — BALSALAZIDE DISODIUM 750 MG/1
CAPSULE ORAL
Qty: 0 | Refills: 0 | Status: DISCONTINUED | COMMUNITY
Start: 1900-01-01

## 2022-10-13 RX ORDER — HYOSCYAMINE SULFATE 0.12 MG/1
1 TABLET AS NEEDED TABLET ORAL THREE TIMES A DAY
Qty: 270 TABLET | Refills: 1 | Status: ACTIVE | COMMUNITY
Start: 2022-08-05 | End: 2023-01-31

## 2022-10-13 NOTE — HPI-TODAY'S VISIT:
2020 Patient has occasional abdominal cramping. She continues to take Balsalazide, and Bentyl 3 tablets a day. She has been off of Budesonide for the past 1 year. She states soft cheeses trigger her symptoms. Denies any diarrhea or rectal bleeding.  2020  Colonoscopy on 2019 was normal. Biopsies showed no signs of lymphocytic colitis. Patient presents for a follow up office visit. Patient states she was doing well the past 3 months. She had an episode of diarrhea on Monday. She has been consuming milk and dairy products very minimally. She reports improvement on Bentyl, with occasional aching feeling in the stomach. She continued to take Balsalazide, and just ran out yesterday.   2021 Patient presents for a f/u office visit. She says her diarrhea and other symptoms have improved significantly, back to normal. Her symptoms subsided without steroid medication, which she did not take because it was 6 months . She still takes balsalazide 3 times a day, has been taking it for about over a year now. She stopped entocort about 2 years ago. She says the cause of her recent flare up was unknown, possibly stress or food. Her  was recently diagnosed with prostate cancer which contributed to her stress at the time. Denies heartburn or acid reflux symptoms. She mostly avoids milk and dairy products, she is aware of which foods cause her irritation. Entocort caused her skin tears and discoloration in her leg previously, improved since she stopped.   2021 Patient presents for a follow up office visit. Patient reports her diarrhea occurs infrequently now, often triggered by salad and dairy products. She currently takes Balsalazide 750mg twice a day and Dicyclomine as needed. She is not using Entocort. Overall, she is doing well.   2022 Labs from 2021 were normal. Patient reports she is doing well on Balsalazide 750mg twice a day. Diarrhea only presents occasionally now. She has been off of Entocort for over 1.5 years. She admits joint pain in her hands and knee from arthritis. She denies any heartburn or acid reflux.   2022 Patient presents for follow up. Labs on 2022 showed normal kidney function. Patient reports "aching" discomfort in lower abdomen and lower back for the past month. Back pain is newly onset. She had two episodes of nocturnal cramping, improved after having bowel movement. Diarrhea presents occasionally now, significantly improved on Budesonide. She takes Dicyclomine tid, Balsalazide 750mg bid. Labs with her PCP 1 month ago showed low vitamin B12. She has been taking tumeric, has not started vitamin B12 injections yet.  2022 Patient presents for a follow up for lower back and abdominal pain. Patient denies any significant symptoms and she states that she still takes hyoscymine and she will start azathioprine. Patient states that she is also still on the balsalazide. Patient denies any back pain and joint pains. She states that the only time that she has pain in her back is when she bends over and then straightens out. Yet, she states that she is over all feeling a lot better and her pain has improved.  10/13/2022 Patient presents for lower back pain and abdominal pain. Patient states that she feels fine, but that she could not tolerate the azathioprine due to having nausea. She states that she is still taking the balsalazide and hyoscyamine. Patient states that she does not have loose bowels anymore. She denies joint pain and back pain. Patient states that her last colonoscopy was 3 years ago. Patient states that she has arthritis. Patient states that she has been getting b12 shots and that she got blood work done about 3-4 weeks ago.

## 2022-11-11 ENCOUNTER — OFFICE VISIT (OUTPATIENT)
Dept: URBAN - METROPOLITAN AREA SURGERY CENTER 13 | Facility: SURGERY CENTER | Age: 67
End: 2022-11-11
Payer: COMMERCIAL

## 2022-11-11 ENCOUNTER — WEB ENCOUNTER (OUTPATIENT)
Dept: URBAN - METROPOLITAN AREA CLINIC 115 | Facility: CLINIC | Age: 67
End: 2022-11-11

## 2022-11-11 DIAGNOSIS — K52.832 COLITIS, UNSPEC (558.9): ICD-10-CM

## 2022-11-11 DIAGNOSIS — K63.89 BACTERIAL OVERGROWTH SYNDROME: ICD-10-CM

## 2022-11-11 PROCEDURE — 45380 COLONOSCOPY AND BIOPSY: CPT | Performed by: INTERNAL MEDICINE

## 2022-11-11 PROCEDURE — G8907 PT DOC NO EVENTS ON DISCHARG: HCPCS | Performed by: INTERNAL MEDICINE

## 2022-12-08 ENCOUNTER — OFFICE VISIT (OUTPATIENT)
Dept: URBAN - METROPOLITAN AREA CLINIC 115 | Facility: CLINIC | Age: 67
End: 2022-12-08
Payer: COMMERCIAL

## 2022-12-08 VITALS — TEMPERATURE: 97.8 F | WEIGHT: 198.8 LBS | HEIGHT: 66 IN | BODY MASS INDEX: 31.95 KG/M2

## 2022-12-08 DIAGNOSIS — Z86.010 PERSONAL HISTORY OF COLONIC POLYPS: ICD-10-CM

## 2022-12-08 DIAGNOSIS — K52.832 LYMPHOCYTIC COLITIS: ICD-10-CM

## 2022-12-08 DIAGNOSIS — R10.84 ABDOMINAL PAIN, GENERALIZED: ICD-10-CM

## 2022-12-08 DIAGNOSIS — R19.7 DIARRHEA: ICD-10-CM

## 2022-12-08 PROCEDURE — G8427 DOCREV CUR MEDS BY ELIG CLIN: HCPCS | Performed by: INTERNAL MEDICINE

## 2022-12-08 PROCEDURE — G9903 PT SCRN TBCO ID AS NON USER: HCPCS | Performed by: INTERNAL MEDICINE

## 2022-12-08 PROCEDURE — 99214 OFFICE O/P EST MOD 30 MIN: CPT | Performed by: INTERNAL MEDICINE

## 2022-12-08 PROCEDURE — G8417 CALC BMI ABV UP PARAM F/U: HCPCS | Performed by: INTERNAL MEDICINE

## 2022-12-08 RX ORDER — HYOSCYAMINE SULFATE 0.12 MG/1
1 TABLET AS NEEDED TABLET ORAL THREE TIMES A DAY
Qty: 270 TABLET | Refills: 1 | Status: ACTIVE | COMMUNITY
Start: 2022-10-13 | End: 2023-04-11

## 2022-12-08 RX ORDER — BALSALAZIDE DISODIUM 750 MG/1
1 CAPSULE CAPSULE ORAL THREE TIMES A DAY
Qty: 270 CAPSULE | Refills: 2 | Status: ACTIVE | COMMUNITY
Start: 2022-10-13 | End: 2023-07-10

## 2022-12-08 RX ORDER — BALSALAZIDE DISODIUM 750 MG/1
1 CAPSULE CAPSULE ORAL THREE TIMES A DAY
Qty: 270 CAPSULE | Refills: 2 | Status: ACTIVE | COMMUNITY
Start: 2022-07-07 | End: 2023-04-03

## 2022-12-08 RX ORDER — HYOSCYAMINE SULFATE 0.12 MG/1
1 TABLET AS NEEDED TABLET ORAL THREE TIMES A DAY
Qty: 270 TABLET | Refills: 1 | Status: ACTIVE | COMMUNITY
Start: 2022-08-05 | End: 2023-01-31

## 2022-12-08 RX ORDER — FLUTICASONE PROPIONATE 50 UG/1
SPRAY 1 - 2 SPRAYS (50 - 100 MCG) IN EACH NOSTRIL BY INTRANASAL ROUTE ONCE DAILY SPRAY, METERED NASAL 1
Qty: 1 | Refills: 0 | Status: ACTIVE | COMMUNITY
Start: 1900-01-01

## 2022-12-08 NOTE — HPI-TODAY'S VISIT:
2020 Patient has occasional abdominal cramping. She continues to take Balsalazide, and Bentyl 3 tablets a day. She has been off of Budesonide for the past 1 year. She states soft cheeses trigger her symptoms. Denies any diarrhea or rectal bleeding.  2020  Colonoscopy on 2019 was normal. Biopsies showed no signs of lymphocytic colitis. Patient presents for a follow up office visit. Patient states she was doing well the past 3 months. She had an episode of diarrhea on Monday. She has been consuming milk and dairy products very minimally. She reports improvement on Bentyl, with occasional aching feeling in the stomach. She continued to take Balsalazide, and just ran out yesterday.   2021 Patient presents for a f/u office visit. She says her diarrhea and other symptoms have improved significantly, back to normal. Her symptoms subsided without steroid medication, which she did not take because it was 6 months . She still takes balsalazide 3 times a day, has been taking it for about over a year now. She stopped entocort about 2 years ago. She says the cause of her recent flare up was unknown, possibly stress or food. Her  was recently diagnosed with prostate cancer which contributed to her stress at the time. Denies heartburn or acid reflux symptoms. She mostly avoids milk and dairy products, she is aware of which foods cause her irritation. Entocort caused her skin tears and discoloration in her leg previously, improved since she stopped.   2021 Patient presents for a follow up office visit. Patient reports her diarrhea occurs infrequently now, often triggered by salad and dairy products. She currently takes Balsalazide 750mg twice a day and Dicyclomine as needed. She is not using Entocort. Overall, she is doing well.   2022 Labs from 2021 were normal. Patient reports she is doing well on Balsalazide 750mg twice a day. Diarrhea only presents occasionally now. She has been off of Entocort for over 1.5 years. She admits joint pain in her hands and knee from arthritis. She denies any heartburn or acid reflux.   2022 Patient presents for follow up. Labs on 2022 showed normal kidney function. Patient reports "aching" discomfort in lower abdomen and lower back for the past month. Back pain is newly onset. She had two episodes of nocturnal cramping, improved after having bowel movement. Diarrhea presents occasionally now, significantly improved on Budesonide. She takes Dicyclomine tid, Balsalazide 750mg bid. Labs with her PCP 1 month ago showed low vitamin B12. She has been taking tumeric, has not started vitamin B12 injections yet.  2022 Patient presents for a follow up for lower back and abdominal pain. Patient denies any significant symptoms and she states that she still takes hyoscymine and she will start azathioprine. Patient states that she is also still on the balsalazide. Patient denies any back pain and joint pains. She states that the only time that she has pain in her back is when she bends over and then straightens out. Yet, she states that she is over all feeling a lot better and her pain has improved.  10/13/2022 Patient presents for lower back pain and abdominal pain. Patient states that she feels fine, but that she could not tolerate the azathioprine due to having nausea. She states that she is still taking the balsalazide and hyoscyamine. Patient states that she does not have loose bowels anymore. She denies joint pain and back pain. Patient states that her last colonoscopy was 3 years ago. Patient states that she has arthritis. Patient states that she has been getting b12 shots and that she got blood work done about 3-4 weeks ago.  2022 Patient presents for a follow up on colonoscopy. Patient states that she still takes budesonide three times a day. She states that she is unsure as to when was the last time she had a flare up in her colon. She states that her symptoms got better after switching dicyclomine to hyoscyamine. She denies issues with bloating or constipation and denies dysphagia.

## 2023-01-12 ENCOUNTER — OFFICE VISIT (OUTPATIENT)
Dept: URBAN - METROPOLITAN AREA CLINIC 115 | Facility: CLINIC | Age: 68
End: 2023-01-12

## 2023-03-09 ENCOUNTER — OFFICE VISIT (OUTPATIENT)
Dept: URBAN - METROPOLITAN AREA CLINIC 115 | Facility: CLINIC | Age: 68
End: 2023-03-09
Payer: COMMERCIAL

## 2023-03-09 VITALS
SYSTOLIC BLOOD PRESSURE: 128 MMHG | WEIGHT: 199 LBS | HEART RATE: 40 BPM | DIASTOLIC BLOOD PRESSURE: 79 MMHG | TEMPERATURE: 97.6 F | HEIGHT: 66 IN | BODY MASS INDEX: 31.98 KG/M2

## 2023-03-09 DIAGNOSIS — K52.832 LYMPHOCYTIC COLITIS: ICD-10-CM

## 2023-03-09 DIAGNOSIS — R10.84 ABDOMINAL PAIN, GENERALIZED: ICD-10-CM

## 2023-03-09 DIAGNOSIS — Z86.010 PERSONAL HISTORY OF COLONIC POLYPS: ICD-10-CM

## 2023-03-09 DIAGNOSIS — R19.7 DIARRHEA: ICD-10-CM

## 2023-03-09 PROCEDURE — G9903 PT SCRN TBCO ID AS NON USER: HCPCS | Performed by: INTERNAL MEDICINE

## 2023-03-09 PROCEDURE — G8417 CALC BMI ABV UP PARAM F/U: HCPCS | Performed by: INTERNAL MEDICINE

## 2023-03-09 PROCEDURE — 99214 OFFICE O/P EST MOD 30 MIN: CPT | Performed by: INTERNAL MEDICINE

## 2023-03-09 PROCEDURE — G8427 DOCREV CUR MEDS BY ELIG CLIN: HCPCS | Performed by: INTERNAL MEDICINE

## 2023-03-09 RX ORDER — BALSALAZIDE DISODIUM 750 MG/1
1 CAPSULE CAPSULE ORAL THREE TIMES A DAY
Qty: 270 CAPSULE | Refills: 2 | Status: ACTIVE | COMMUNITY
Start: 2022-07-07 | End: 2023-04-03

## 2023-03-09 RX ORDER — FLUTICASONE PROPIONATE 50 UG/1
SPRAY 1 - 2 SPRAYS (50 - 100 MCG) IN EACH NOSTRIL BY INTRANASAL ROUTE ONCE DAILY SPRAY, METERED NASAL 1
Qty: 1 | Refills: 0 | Status: ACTIVE | COMMUNITY
Start: 1900-01-01

## 2023-03-09 RX ORDER — HYOSCYAMINE SULFATE 0.12 MG/1
1 TABLET AS NEEDED TABLET ORAL THREE TIMES A DAY
Qty: 270 TABLET | Refills: 1 | Status: ACTIVE | COMMUNITY
Start: 2022-10-13 | End: 2023-04-11

## 2023-03-09 RX ORDER — BALSALAZIDE DISODIUM 750 MG/1
1 CAPSULE CAPSULE ORAL THREE TIMES A DAY
Qty: 270 CAPSULE | Refills: 2 | Status: ACTIVE | COMMUNITY
Start: 2022-10-13 | End: 2023-07-10

## 2023-03-09 RX ORDER — CHOLESTYRAMINE 4 G/9G
1 PACKET MIXED WITH WATER OR NON-CARBONATED DRINK POWDER, FOR SUSPENSION ORAL ONCE A DAY
Qty: 90 | Refills: 1 | OUTPATIENT
Start: 2023-03-09

## 2023-03-09 RX ORDER — HYOSCYAMINE SULFATE 0.12 MG/1
1 TABLET AS NEEDED TABLET ORAL THREE TIMES A DAY
Qty: 270 TABLET | Refills: 1 | OUTPATIENT
Start: 2023-03-09 | End: 2023-09-05

## 2023-03-09 NOTE — HPI-TODAY'S VISIT:
2020 Patient has occasional abdominal cramping. She continues to take Balsalazide, and Bentyl 3 tablets a day. She has been off of Budesonide for the past 1 year. She states soft cheeses trigger her symptoms. Denies any diarrhea or rectal bleeding.  2020  Colonoscopy on 2019 was normal. Biopsies showed no signs of lymphocytic colitis. Patient presents for a follow up office visit. Patient states she was doing well the past 3 months. She had an episode of diarrhea on Monday. She has been consuming milk and dairy products very minimally. She reports improvement on Bentyl, with occasional aching feeling in the stomach. She continued to take Balsalazide, and just ran out yesterday.   2021 Patient presents for a f/u office visit. She says her diarrhea and other symptoms have improved significantly, back to normal. Her symptoms subsided without steroid medication, which she did not take because it was 6 months . She still takes balsalazide 3 times a day, has been taking it for about over a year now. She stopped entocort about 2 years ago. She says the cause of her recent flare up was unknown, possibly stress or food. Her  was recently diagnosed with prostate cancer which contributed to her stress at the time. Denies heartburn or acid reflux symptoms. She mostly avoids milk and dairy products, she is aware of which foods cause her irritation. Entocort caused her skin tears and discoloration in her leg previously, improved since she stopped.   2021 Patient presents for a follow up office visit. Patient reports her diarrhea occurs infrequently now, often triggered by salad and dairy products. She currently takes Balsalazide 750mg twice a day and Dicyclomine as needed. She is not using Entocort. Overall, she is doing well.   2022 Labs from 2021 were normal. Patient reports she is doing well on Balsalazide 750mg twice a day. Diarrhea only presents occasionally now. She has been off of Entocort for over 1.5 years. She admits joint pain in her hands and knee from arthritis. She denies any heartburn or acid reflux.   2022 Patient presents for follow up. Labs on 2022 showed normal kidney function. Patient reports "aching" discomfort in lower abdomen and lower back for the past month. Back pain is newly onset. She had two episodes of nocturnal cramping, improved after having bowel movement. Diarrhea presents occasionally now, significantly improved on Budesonide. She takes Dicyclomine tid, Balsalazide 750mg bid. Labs with her PCP 1 month ago showed low vitamin B12. She has been taking tumeric, has not started vitamin B12 injections yet.  2022 Patient presents for a follow up for lower back and abdominal pain. Patient denies any significant symptoms and she states that she still takes hyoscymine and she will start azathioprine. Patient states that she is also still on the balsalazide. Patient denies any back pain and joint pains. She states that the only time that she has pain in her back is when she bends over and then straightens out. Yet, she states that she is over all feeling a lot better and her pain has improved.  10/13/2022 Patient presents for lower back pain and abdominal pain. Patient states that she feels fine, but that she could not tolerate the azathioprine due to having nausea. She states that she is still taking the balsalazide and hyoscyamine. Patient states that she does not have loose bowels anymore. She denies joint pain and back pain. Patient states that her last colonoscopy was 3 years ago. Patient states that she has arthritis. Patient states that she has been getting b12 shots and that she got blood work done about 3-4 weeks ago.  2022 Patient presents for a follow up on colonoscopy. Patient states that she still takes budesonide three times a day. She states that she is unsure as to when was the last time she had a flare up in her colon. She states that her symptoms got better after switching dicyclomine to hyoscyamine. She denies issues with bloating or constipation and denies dysphagia.  3/9/2023 Patient presents for a 3 month follow up. Patient states that she has been taking the hyoscyamine and that she has not been feeling bloating or abdominal pain. She does state that she has loose bowels, but that she has not started the balsalazide. Patient states that one day she had a smoothie, only fruit and no milk, and that her flare up was very bad. Patient denies anxiety, stress, or depression. She states that the hyoscyamine has helped with the abdominal cramping and the noise.

## 2023-03-29 ENCOUNTER — WEB ENCOUNTER (OUTPATIENT)
Dept: URBAN - METROPOLITAN AREA CLINIC 115 | Facility: CLINIC | Age: 68
End: 2023-03-29

## 2023-05-11 ENCOUNTER — OFFICE VISIT (OUTPATIENT)
Dept: URBAN - METROPOLITAN AREA CLINIC 115 | Facility: CLINIC | Age: 68
End: 2023-05-11

## 2023-06-08 ENCOUNTER — OFFICE VISIT (OUTPATIENT)
Dept: URBAN - METROPOLITAN AREA CLINIC 115 | Facility: CLINIC | Age: 68
End: 2023-06-08
Payer: COMMERCIAL

## 2023-06-08 VITALS
HEIGHT: 66 IN | TEMPERATURE: 97.6 F | BODY MASS INDEX: 31.85 KG/M2 | DIASTOLIC BLOOD PRESSURE: 73 MMHG | SYSTOLIC BLOOD PRESSURE: 134 MMHG | WEIGHT: 198.2 LBS | HEART RATE: 76 BPM

## 2023-06-08 DIAGNOSIS — Z86.010 PERSONAL HISTORY OF COLONIC POLYPS: ICD-10-CM

## 2023-06-08 DIAGNOSIS — R19.7 DIARRHEA: ICD-10-CM

## 2023-06-08 DIAGNOSIS — R10.84 ABDOMINAL PAIN, GENERALIZED: ICD-10-CM

## 2023-06-08 DIAGNOSIS — K52.832 LYMPHOCYTIC COLITIS: ICD-10-CM

## 2023-06-08 PROCEDURE — G8427 DOCREV CUR MEDS BY ELIG CLIN: HCPCS | Performed by: INTERNAL MEDICINE

## 2023-06-08 PROCEDURE — G9903 PT SCRN TBCO ID AS NON USER: HCPCS | Performed by: INTERNAL MEDICINE

## 2023-06-08 PROCEDURE — G8417 CALC BMI ABV UP PARAM F/U: HCPCS | Performed by: INTERNAL MEDICINE

## 2023-06-08 PROCEDURE — 99214 OFFICE O/P EST MOD 30 MIN: CPT | Performed by: INTERNAL MEDICINE

## 2023-06-08 RX ORDER — HYOSCYAMINE SULFATE 0.12 MG/1
1 TABLET AS NEEDED TABLET ORAL THREE TIMES A DAY
Qty: 270 TABLET | Refills: 1 | Status: ACTIVE | COMMUNITY
Start: 2023-03-09 | End: 2023-09-05

## 2023-06-08 RX ORDER — CHOLESTYRAMINE 4 G/9G
1 PACKET MIXED WITH WATER OR NON-CARBONATED DRINK POWDER, FOR SUSPENSION ORAL ONCE A DAY
Qty: 90 | Refills: 1 | Status: ACTIVE | COMMUNITY
Start: 2023-03-09

## 2023-06-08 RX ORDER — FLUTICASONE PROPIONATE 50 UG/1
SPRAY 1 - 2 SPRAYS (50 - 100 MCG) IN EACH NOSTRIL BY INTRANASAL ROUTE ONCE DAILY SPRAY, METERED NASAL 1
Qty: 1 | Refills: 0 | Status: ACTIVE | COMMUNITY
Start: 1900-01-01

## 2023-06-08 RX ORDER — BALSALAZIDE DISODIUM 750 MG/1
1 CAPSULE CAPSULE ORAL THREE TIMES A DAY
Qty: 270 CAPSULE | Refills: 2 | Status: ACTIVE | COMMUNITY
Start: 2022-10-13 | End: 2023-07-10

## 2023-06-08 RX ORDER — ROSUVASTATIN CALCIUM 5 MG/1
1 TABLET TABLET, COATED ORAL ONCE A DAY
Status: ACTIVE | COMMUNITY

## 2023-06-08 NOTE — HPI-TODAY'S VISIT:
2020 Patient has occasional abdominal cramping. She continues to take Balsalazide, and Bentyl 3 tablets a day. She has been off of Budesonide for the past 1 year. She states soft cheeses trigger her symptoms. Denies any diarrhea or rectal bleeding.  2020  Colonoscopy on 2019 was normal. Biopsies showed no signs of lymphocytic colitis. Patient presents for a follow up office visit. Patient states she was doing well the past 3 months. She had an episode of diarrhea on Monday. She has been consuming milk and dairy products very minimally. She reports improvement on Bentyl, with occasional aching feeling in the stomach. She continued to take Balsalazide, and just ran out yesterday.   2021 Patient presents for a f/u office visit. She says her diarrhea and other symptoms have improved significantly, back to normal. Her symptoms subsided without steroid medication, which she did not take because it was 6 months . She still takes balsalazide 3 times a day, has been taking it for about over a year now. She stopped entocort about 2 years ago. She says the cause of her recent flare up was unknown, possibly stress or food. Her  was recently diagnosed with prostate cancer which contributed to her stress at the time. Denies heartburn or acid reflux symptoms. She mostly avoids milk and dairy products, she is aware of which foods cause her irritation. Entocort caused her skin tears and discoloration in her leg previously, improved since she stopped.   2021 Patient presents for a follow up office visit. Patient reports her diarrhea occurs infrequently now, often triggered by salad and dairy products. She currently takes Balsalazide 750mg twice a day and Dicyclomine as needed. She is not using Entocort. Overall, she is doing well.   2022 Labs from 2021 were normal. Patient reports she is doing well on Balsalazide 750mg twice a day. Diarrhea only presents occasionally now. She has been off of Entocort for over 1.5 years. She admits joint pain in her hands and knee from arthritis. She denies any heartburn or acid reflux.   2022 Patient presents for follow up. Labs on 2022 showed normal kidney function. Patient reports "aching" discomfort in lower abdomen and lower back for the past month. Back pain is newly onset. She had two episodes of nocturnal cramping, improved after having bowel movement. Diarrhea presents occasionally now, significantly improved on Budesonide. She takes Dicyclomine tid, Balsalazide 750mg bid. Labs with her PCP 1 month ago showed low vitamin B12. She has been taking tumeric, has not started vitamin B12 injections yet.  2022 Patient presents for a follow up for lower back and abdominal pain. Patient denies any significant symptoms and she states that she still takes hyoscymine and she will start azathioprine. Patient states that she is also still on the balsalazide. Patient denies any back pain and joint pains. She states that the only time that she has pain in her back is when she bends over and then straightens out. Yet, she states that she is over all feeling a lot better and her pain has improved.  10/13/2022 Patient presents for lower back pain and abdominal pain. Patient states that she feels fine, but that she could not tolerate the azathioprine due to having nausea. She states that she is still taking the balsalazide and hyoscyamine. Patient states that she does not have loose bowels anymore. She denies joint pain and back pain. Patient states that her last colonoscopy was 3 years ago. Patient states that she has arthritis. Patient states that she has been getting b12 shots and that she got blood work done about 3-4 weeks ago.  2022 Patient presents for a follow up on colonoscopy. Patient states that she still takes budesonide three times a day. She states that she is unsure as to when was the last time she had a flare up in her colon. She states that her symptoms got better after switching dicyclomine to hyoscyamine. She denies issues with bloating or constipation and denies dysphagia.  3/9/2023 Patient presents for a 3 month follow up. Patient states that she has been taking the hyoscyamine and that she has not been feeling bloating or abdominal pain. She does state that she has loose bowels, but that she has not started the balsalazide. Patient states that one day she had a smoothie, only fruit and no milk, and that her flare up was very bad. Patient denies anxiety, stress, or depression. She states that the hyoscyamine has helped with the abdominal cramping and the noise.  2023 Patient presents for a follow up. Patient states that she is not on any medication anymore and states that she feels better now. Patient states that when she was taking the questran powder, that it really helped. Patient states that she also stopped taking the balsalazide as well. She states that she only limits her diary intake. She states that she was recently put on rosuvastatin.

## 2023-07-26 ENCOUNTER — WEB ENCOUNTER (OUTPATIENT)
Dept: URBAN - METROPOLITAN AREA CLINIC 115 | Facility: CLINIC | Age: 68
End: 2023-07-26

## 2023-07-26 RX ORDER — BALSALAZIDE DISODIUM 750 MG/1
1 CAPSULE CAPSULE ORAL THREE TIMES A DAY
Qty: 270 CAPSULE | Refills: 2
Start: 2022-10-13 | End: 2024-04-22

## 2023-12-07 ENCOUNTER — OFFICE VISIT (OUTPATIENT)
Dept: URBAN - METROPOLITAN AREA CLINIC 115 | Facility: CLINIC | Age: 68
End: 2023-12-07

## 2023-12-14 ENCOUNTER — OFFICE VISIT (OUTPATIENT)
Dept: URBAN - METROPOLITAN AREA CLINIC 115 | Facility: CLINIC | Age: 68
End: 2023-12-14

## 2024-01-18 ENCOUNTER — WEB ENCOUNTER (OUTPATIENT)
Dept: URBAN - METROPOLITAN AREA CLINIC 115 | Facility: CLINIC | Age: 69
End: 2024-01-18

## 2024-01-18 ENCOUNTER — OFFICE VISIT (OUTPATIENT)
Dept: URBAN - METROPOLITAN AREA CLINIC 115 | Facility: CLINIC | Age: 69
End: 2024-01-18
Payer: COMMERCIAL

## 2024-01-18 ENCOUNTER — DASHBOARD ENCOUNTERS (OUTPATIENT)
Age: 69
End: 2024-01-18

## 2024-01-18 VITALS
TEMPERATURE: 97.5 F | HEIGHT: 66 IN | DIASTOLIC BLOOD PRESSURE: 62 MMHG | HEART RATE: 102 BPM | BODY MASS INDEX: 31.24 KG/M2 | WEIGHT: 194.4 LBS | SYSTOLIC BLOOD PRESSURE: 98 MMHG

## 2024-01-18 DIAGNOSIS — R19.7 DIARRHEA: ICD-10-CM

## 2024-01-18 DIAGNOSIS — K52.832 LYMPHOCYTIC COLITIS: ICD-10-CM

## 2024-01-18 DIAGNOSIS — R10.84 ABDOMINAL PAIN, GENERALIZED: ICD-10-CM

## 2024-01-18 DIAGNOSIS — Z86.010 PERSONAL HISTORY OF COLONIC POLYPS: ICD-10-CM

## 2024-01-18 PROBLEM — 102614006 GENERALIZED ABDOMINAL PAIN: Status: ACTIVE | Noted: 2020-09-17

## 2024-01-18 PROCEDURE — 99214 OFFICE O/P EST MOD 30 MIN: CPT | Performed by: INTERNAL MEDICINE

## 2024-01-18 RX ORDER — BALSALAZIDE DISODIUM 750 MG/1
1 CAPSULE CAPSULE ORAL THREE TIMES A DAY
Qty: 270 CAPSULE | Refills: 2 | Status: ACTIVE | COMMUNITY
Start: 2022-10-13 | End: 2024-04-22

## 2024-01-18 RX ORDER — HYOSCYAMINE SULFATE 0.12 MG/1
1 TABLET AS NEEDED TABLET ORAL THREE TIMES A DAY
Qty: 270 TABLET | Refills: 1 | OUTPATIENT
Start: 2024-01-18 | End: 2024-07-16

## 2024-01-18 RX ORDER — CHOLESTYRAMINE 4 G/9G
1 PACKET MIXED WITH WATER OR NON-CARBONATED DRINK POWDER, FOR SUSPENSION ORAL ONCE A DAY
Qty: 90 | Refills: 1 | Status: ACTIVE | COMMUNITY
Start: 2023-03-09

## 2024-01-18 RX ORDER — ROSUVASTATIN CALCIUM 5 MG/1
1 TABLET TABLET, COATED ORAL ONCE A DAY
Status: ACTIVE | COMMUNITY

## 2024-01-18 RX ORDER — FLUTICASONE PROPIONATE 50 UG/1
SPRAY 1 - 2 SPRAYS (50 - 100 MCG) IN EACH NOSTRIL BY INTRANASAL ROUTE ONCE DAILY SPRAY, METERED NASAL 1
Qty: 1 | Refills: 0 | Status: ACTIVE | COMMUNITY
Start: 1900-01-01

## 2024-01-18 NOTE — HPI-TODAY'S VISIT:
2020 Patient has occasional abdominal cramping. She continues to take Balsalazide, and Bentyl 3 tablets a day. She has been off of Budesonide for the past 1 year. She states soft cheeses trigger her symptoms. Denies any diarrhea or rectal bleeding.  2020  Colonoscopy on 2019 was normal. Biopsies showed no signs of lymphocytic colitis. Patient presents for a follow up office visit. Patient states she was doing well the past 3 months. She had an episode of diarrhea on Monday. She has been consuming milk and dairy products very minimally. She reports improvement on Bentyl, with occasional aching feeling in the stomach. She continued to take Balsalazide, and just ran out yesterday.   2021 Patient presents for a f/u office visit. She says her diarrhea and other symptoms have improved significantly, back to normal. Her symptoms subsided without steroid medication, which she did not take because it was 6 months . She still takes balsalazide 3 times a day, has been taking it for about over a year now. She stopped entocort about 2 years ago. She says the cause of her recent flare up was unknown, possibly stress or food. Her  was recently diagnosed with prostate cancer which contributed to her stress at the time. Denies heartburn or acid reflux symptoms. She mostly avoids milk and dairy products, she is aware of which foods cause her irritation. Entocort caused her skin tears and discoloration in her leg previously, improved since she stopped.   2021 Patient presents for a follow up office visit. Patient reports her diarrhea occurs infrequently now, often triggered by salad and dairy products. She currently takes Balsalazide 750mg twice a day and Dicyclomine as needed. She is not using Entocort. Overall, she is doing well.   2022 Labs from 2021 were normal. Patient reports she is doing well on Balsalazide 750mg twice a day. Diarrhea only presents occasionally now. She has been off of Entocort for over 1.5 years. She admits joint pain in her hands and knee from arthritis. She denies any heartburn or acid reflux.   2022 Patient presents for follow up. Labs on 2022 showed normal kidney function. Patient reports "aching" discomfort in lower abdomen and lower back for the past month. Back pain is newly onset. She had two episodes of nocturnal cramping, improved after having bowel movement. Diarrhea presents occasionally now, significantly improved on Budesonide. She takes Dicyclomine tid, Balsalazide 750mg bid. Labs with her PCP 1 month ago showed low vitamin B12. She has been taking tumeric, has not started vitamin B12 injections yet.  2022 Patient presents for a follow up for lower back and abdominal pain. Patient denies any significant symptoms and she states that she still takes hyoscymine and she will start azathioprine. Patient states that she is also still on the balsalazide. Patient denies any back pain and joint pains. She states that the only time that she has pain in her back is when she bends over and then straightens out. Yet, she states that she is over all feeling a lot better and her pain has improved.  10/13/2022 Patient presents for lower back pain and abdominal pain. Patient states that she feels fine, but that she could not tolerate the azathioprine due to having nausea. She states that she is still taking the balsalazide and hyoscyamine. Patient states that she does not have loose bowels anymore. She denies joint pain and back pain. Patient states that her last colonoscopy was 3 years ago. Patient states that she has arthritis. Patient states that she has been getting b12 shots and that she got blood work done about 3-4 weeks ago.  2022 Patient presents for a follow up on colonoscopy. Patient states that she still takes budesonide three times a day. She states that she is unsure as to when was the last time she had a flare up in her colon. She states that her symptoms got better after switching dicyclomine to hyoscyamine. She denies issues with bloating or constipation and denies dysphagia.  3/9/2023 Patient presents for a 3 month follow up. Patient states that she has been taking the hyoscyamine and that she has not been feeling bloating or abdominal pain. She does state that she has loose bowels, but that she has not started the balsalazide. Patient states that one day she had a smoothie, only fruit and no milk, and that her flare up was very bad. Patient denies anxiety, stress, or depression. She states that the hyoscyamine has helped with the abdominal cramping and the noise.  2023 Patient presents for a follow up. Patient states that she is not on any medication anymore and states that she feels better now. Patient states that when she was taking the questran powder, that it really helped. Patient states that she also stopped taking the balsalazide as well. She states that she only limits her diary intake. She states that she was recently put on rosuvastatin.  2024 Patient presents for a follow up. Patient states that she has only been having loose bowels a couple times and that when it occurs she has intense abdominal cramping. She is still on the questran powder, but she states that she takes it only when she has a flare up of loose bowels and abdominal pain. She is still on hyoscyamine and balsalazide. Overall, patient's symptoms have improved.

## 2024-01-23 ENCOUNTER — TELEPHONE ENCOUNTER (OUTPATIENT)
Dept: URBAN - METROPOLITAN AREA CLINIC 115 | Facility: CLINIC | Age: 69
End: 2024-01-23

## 2024-01-23 ENCOUNTER — WEB ENCOUNTER (OUTPATIENT)
Dept: URBAN - METROPOLITAN AREA CLINIC 115 | Facility: CLINIC | Age: 69
End: 2024-01-23

## 2024-01-24 ENCOUNTER — WEB ENCOUNTER (OUTPATIENT)
Dept: URBAN - METROPOLITAN AREA CLINIC 115 | Facility: CLINIC | Age: 69
End: 2024-01-24

## 2024-01-24 LAB
ALMOND (F20) IGE: <0.1
CASHEW NUT (F202) IGE: <0.1
CLASS: 0
CODFISH (F3) IGE: <0.1
COW'S MILK (F2) IGE: <0.1
EGG WHITE (F1) IGE: <0.1
HAZELNUT (F17) IGE: <0.1
PEANUT (F13) IGE: <0.1
SALMON (F41) IGE: <0.1
SCALLOP (F338) IGE: <0.1
SESAME SEED (F10) IGE: <0.1
SHRIMP (F24) IGE: <0.1
SOYBEAN (F14) IGE: <0.1
TUNA (F40) IGE: <0.1
WALNUT (F256) IGE: <0.1
WHEAT (F4) IGE: <0.1

## 2024-01-25 ENCOUNTER — WEB ENCOUNTER (OUTPATIENT)
Dept: URBAN - METROPOLITAN AREA CLINIC 115 | Facility: CLINIC | Age: 69
End: 2024-01-25

## 2024-01-25 RX ORDER — DICYCLOMINE HYDROCHLORIDE 10 MG/1
1 TABLET CAPSULE ORAL THREE TIMES A DAY
Qty: 270 TABLET | Refills: 1 | OUTPATIENT
Start: 2024-01-29 | End: 2024-07-27

## 2024-08-01 ENCOUNTER — OFFICE VISIT (OUTPATIENT)
Dept: URBAN - METROPOLITAN AREA CLINIC 115 | Facility: CLINIC | Age: 69
End: 2024-08-01
Payer: COMMERCIAL

## 2024-08-01 VITALS
HEIGHT: 66 IN | BODY MASS INDEX: 31.12 KG/M2 | DIASTOLIC BLOOD PRESSURE: 80 MMHG | SYSTOLIC BLOOD PRESSURE: 125 MMHG | TEMPERATURE: 97.2 F | HEART RATE: 79 BPM | WEIGHT: 193.6 LBS

## 2024-08-01 DIAGNOSIS — R58 ECCHYMOSIS: ICD-10-CM

## 2024-08-01 DIAGNOSIS — Z86.010 PERSONAL HISTORY OF COLONIC POLYPS: ICD-10-CM

## 2024-08-01 DIAGNOSIS — R10.84 ABDOMINAL PAIN, GENERALIZED: ICD-10-CM

## 2024-08-01 DIAGNOSIS — K52.832 LYMPHOCYTIC COLITIS: ICD-10-CM

## 2024-08-01 DIAGNOSIS — R19.7 DIARRHEA: ICD-10-CM

## 2024-08-01 PROCEDURE — 99214 OFFICE O/P EST MOD 30 MIN: CPT | Performed by: INTERNAL MEDICINE

## 2024-08-01 RX ORDER — ROSUVASTATIN CALCIUM 5 MG/1
1 TABLET TABLET, COATED ORAL ONCE A DAY
Status: ACTIVE | COMMUNITY

## 2024-08-01 RX ORDER — FLUTICASONE PROPIONATE 50 UG/1
SPRAY 1 - 2 SPRAYS (50 - 100 MCG) IN EACH NOSTRIL BY INTRANASAL ROUTE ONCE DAILY SPRAY, METERED NASAL 1
Qty: 1 | Refills: 0 | Status: ACTIVE | COMMUNITY
Start: 1900-01-01

## 2024-08-01 RX ORDER — DICYCLOMINE HYDROCHLORIDE 10 MG/1
1 TABLET CAPSULE ORAL THREE TIMES A DAY
Qty: 270 TABLET | Refills: 1 | OUTPATIENT
Start: 2024-08-01 | End: 2025-01-27

## 2024-08-01 RX ORDER — CHOLESTYRAMINE 4 G/9G
1 PACKET MIXED WITH WATER OR NON-CARBONATED DRINK POWDER, FOR SUSPENSION ORAL ONCE A DAY
Qty: 90 | Refills: 1 | Status: ACTIVE | COMMUNITY
Start: 2023-03-09

## 2024-08-16 NOTE — PHYSICAL EXAM PSYCH:
"  Caller: Jose Luis Ahuja \"Dale\"    Relationship: Self    Best call back number: [phone]     What is the best time to reach you: ANY    Who are you requesting to speak with (clinical staff, provider,  specific staff member): NURSE    Do you know the name of the person who called: PATIENT    What was the call regarding: PATIENT HAVING LEG MUSCLE CRAMPS WITH CRESTOR.  CAN WE REDUCE THE DOSAGE OF MEDICATION?    PHARMACY:  [facility]    Is it okay if the provider responds through MyChart: PHONE CALL PLEASE  " normal mood with appropriate affect

## 2024-09-16 ENCOUNTER — TELEPHONE ENCOUNTER (OUTPATIENT)
Dept: URBAN - METROPOLITAN AREA CLINIC 115 | Facility: CLINIC | Age: 69
End: 2024-09-16

## 2024-09-16 RX ORDER — HYOSCYAMINE SULFATE 0.12 MG/1
1 TABLET AS NEEDED TABLET ORAL THREE TIMES A DAY
Qty: 270 TABLET | Refills: 1
Start: 2024-01-18 | End: 2025-03-15

## 2024-11-30 ENCOUNTER — WEB ENCOUNTER (OUTPATIENT)
Dept: URBAN - METROPOLITAN AREA CLINIC 115 | Facility: CLINIC | Age: 69
End: 2024-11-30

## 2024-12-19 ENCOUNTER — OFFICE VISIT (OUTPATIENT)
Dept: URBAN - METROPOLITAN AREA CLINIC 115 | Facility: CLINIC | Age: 69
End: 2024-12-19
Payer: COMMERCIAL

## 2024-12-19 VITALS
WEIGHT: 192.6 LBS | HEIGHT: 66 IN | TEMPERATURE: 97.7 F | DIASTOLIC BLOOD PRESSURE: 79 MMHG | HEART RATE: 105 BPM | BODY MASS INDEX: 30.95 KG/M2 | SYSTOLIC BLOOD PRESSURE: 108 MMHG

## 2024-12-19 DIAGNOSIS — R19.7 DIARRHEA: ICD-10-CM

## 2024-12-19 DIAGNOSIS — Z86.0100 PERSONAL HISTORY OF COLONIC POLYPS: ICD-10-CM

## 2024-12-19 DIAGNOSIS — R10.84 ABDOMINAL PAIN, GENERALIZED: ICD-10-CM

## 2024-12-19 DIAGNOSIS — R58 ECCHYMOSIS: ICD-10-CM

## 2024-12-19 DIAGNOSIS — K52.832 LYMPHOCYTIC COLITIS: ICD-10-CM

## 2024-12-19 PROCEDURE — 99214 OFFICE O/P EST MOD 30 MIN: CPT | Performed by: INTERNAL MEDICINE

## 2024-12-19 RX ORDER — CHOLESTYRAMINE 4 G/9G
1 PACKET MIXED WITH WATER OR NON-CARBONATED DRINK POWDER, FOR SUSPENSION ORAL ONCE A DAY
Qty: 90 | Refills: 1 | Status: ACTIVE | COMMUNITY
Start: 2023-03-09

## 2024-12-19 RX ORDER — DICYCLOMINE HYDROCHLORIDE 10 MG/1
1 TABLET CAPSULE ORAL THREE TIMES A DAY
Qty: 270 TABLET | Refills: 1 | Status: ACTIVE | COMMUNITY
Start: 2024-08-01 | End: 2025-01-27

## 2024-12-19 RX ORDER — BUDESONIDE 3 MG/1
1 CAPSULES CAPSULE ORAL THREE TIMES A DAY
Qty: 270 CAPSULE | Refills: 1 | Status: ACTIVE | COMMUNITY
Start: 2024-12-03 | End: 2025-06-01

## 2024-12-19 RX ORDER — HYOSCYAMINE SULFATE 0.12 MG/1
1 TABLET AS NEEDED TABLET ORAL THREE TIMES A DAY
Qty: 270 TABLET | Refills: 1 | Status: ACTIVE | COMMUNITY
Start: 2024-01-18 | End: 2025-03-15

## 2024-12-19 RX ORDER — FLUTICASONE PROPIONATE 50 UG/1
SPRAY 1 - 2 SPRAYS (50 - 100 MCG) IN EACH NOSTRIL BY INTRANASAL ROUTE ONCE DAILY SPRAY, METERED NASAL 1
Qty: 1 | Refills: 0 | Status: ACTIVE | COMMUNITY
Start: 1900-01-01

## 2024-12-19 RX ORDER — ROSUVASTATIN CALCIUM 5 MG/1
1 TABLET TABLET, COATED ORAL ONCE A DAY
Status: ACTIVE | COMMUNITY

## 2024-12-19 NOTE — HPI-TODAY'S VISIT:
2020 Patient has occasional abdominal cramping. She continues to take Balsalazide, and Bentyl 3 tablets a day. She has been off of Budesonide for the past 1 year. She states soft cheeses trigger her symptoms. Denies any diarrhea or rectal bleeding.  2020  Colonoscopy on 2019 was normal. Biopsies showed no signs of lymphocytic colitis. Patient presents for a follow up office visit. Patient states she was doing well the past 3 months. She had an episode of diarrhea on Monday. She has been consuming milk and dairy products very minimally. She reports improvement on Bentyl, with occasional aching feeling in the stomach. She continued to take Balsalazide, and just ran out yesterday.   2021 Patient presents for a f/u office visit. She says her diarrhea and other symptoms have improved significantly, back to normal. Her symptoms subsided without steroid medication, which she did not take because it was 6 months . She still takes balsalazide 3 times a day, has been taking it for about over a year now. She stopped entocort about 2 years ago. She says the cause of her recent flare up was unknown, possibly stress or food. Her  was recently diagnosed with prostate cancer which contributed to her stress at the time. Denies heartburn or acid reflux symptoms. She mostly avoids milk and dairy products, she is aware of which foods cause her irritation. Entocort caused her skin tears and discoloration in her leg previously, improved since she stopped.   2021 Patient presents for a follow up office visit. Patient reports her diarrhea occurs infrequently now, often triggered by salad and dairy products. She currently takes Balsalazide 750mg twice a day and Dicyclomine as needed. She is not using Entocort. Overall, she is doing well.   2022 Labs from 2021 were normal. Patient reports she is doing well on Balsalazide 750mg twice a day. Diarrhea only presents occasionally now. She has been off of Entocort for over 1.5 years. She admits joint pain in her hands and knee from arthritis. She denies any heartburn or acid reflux.   2022 Patient presents for follow up. Labs on 2022 showed normal kidney function. Patient reports "aching" discomfort in lower abdomen and lower back for the past month. Back pain is newly onset. She had two episodes of nocturnal cramping, improved after having bowel movement. Diarrhea presents occasionally now, significantly improved on Budesonide. She takes Dicyclomine tid, Balsalazide 750mg bid. Labs with her PCP 1 month ago showed low vitamin B12. She has been taking tumeric, has not started vitamin B12 injections yet.  2022 Patient presents for a follow up for lower back and abdominal pain. Patient denies any significant symptoms and she states that she still takes hyoscymine and she will start azathioprine. Patient states that she is also still on the balsalazide. Patient denies any back pain and joint pains. She states that the only time that she has pain in her back is when she bends over and then straightens out. Yet, she states that she is over all feeling a lot better and her pain has improved.  10/13/2022 Patient presents for lower back pain and abdominal pain. Patient states that she feels fine, but that she could not tolerate the azathioprine due to having nausea. She states that she is still taking the balsalazide and hyoscyamine. Patient states that she does not have loose bowels anymore. She denies joint pain and back pain. Patient states that her last colonoscopy was 3 years ago. Patient states that she has arthritis. Patient states that she has been getting b12 shots and that she got blood work done about 3-4 weeks ago.  2022 Patient presents for a follow up on colonoscopy. Patient states that she still takes budesonide three times a day. She states that she is unsure as to when was the last time she had a flare up in her colon. She states that her symptoms got better after switching dicyclomine to hyoscyamine. She denies issues with bloating or constipation and denies dysphagia.  3/9/2023 Patient presents for a 3 month follow up. Patient states that she has been taking the hyoscyamine and that she has not been feeling bloating or abdominal pain. She does state that she has loose bowels, but that she has not started the balsalazide. Patient states that one day she had a smoothie, only fruit and no milk, and that her flare up was very bad. Patient denies anxiety, stress, or depression. She states that the hyoscyamine has helped with the abdominal cramping and the noise.  2023 Patient presents for a follow up. Patient states that she is not on any medication anymore and states that she feels better now. Patient states that when she was taking the questran powder, that it really helped. Patient states that she also stopped taking the balsalazide as well. She states that she only limits her diary intake. She states that she was recently put on rosuvastatin.  2024 Patient presents for a follow up. Patient states that she has only been having loose bowels a couple times and that when it occurs she has intense abdominal cramping. She is still on the questran powder, but she states that she takes it only when she has a flare up of loose bowels and abdominal pain. She is still on hyoscyamine and balsalazide. Overall, patient's symptoms have improved.  2024 Patient presents for a 6 month follow up. Patient denies melena, constipation, bloating, and stomach pain. Patient says that she has been taking the hyoscymine once a day and mentions that when she runs out the insurance wont cover a refill so she has dicicylomine. Patient says that she has stopped the Balsalazide.  2024 Patient presents for follow up. She says she had a flare up of back pain, abdominal pain, and diarrhea during gi, and says she had stopped balsalazide and hyoscyamine. When symtpoms started back she went back onto the medication. Patient started budesonside tid 9mg  and causes skin tearing, symptoms resolved.

## 2024-12-27 ENCOUNTER — WEB ENCOUNTER (OUTPATIENT)
Dept: URBAN - METROPOLITAN AREA CLINIC 115 | Facility: CLINIC | Age: 69
End: 2024-12-27

## 2024-12-27 RX ORDER — BALSALAZIDE DISODIUM 750 MG/1
1 CAPSULE CAPSULE ORAL THREE TIMES A DAY
Qty: 270 CAPSULE | Refills: 2 | OUTPATIENT
Start: 2024-12-31 | End: 2025-09-27

## 2025-01-17 ENCOUNTER — TELEPHONE ENCOUNTER (OUTPATIENT)
Dept: URBAN - METROPOLITAN AREA CLINIC 115 | Facility: CLINIC | Age: 70
End: 2025-01-17

## 2025-01-17 RX ORDER — DICYCLOMINE HYDROCHLORIDE 10 MG/1
1 TABLET CAPSULE ORAL THREE TIMES A DAY
Qty: 270 TABLET | Refills: 1
Start: 2024-08-01 | End: 2025-07-16

## 2025-02-06 ENCOUNTER — OFFICE VISIT (OUTPATIENT)
Dept: URBAN - METROPOLITAN AREA CLINIC 115 | Facility: CLINIC | Age: 70
End: 2025-02-06
Payer: COMMERCIAL

## 2025-02-06 VITALS
HEIGHT: 66 IN | TEMPERATURE: 98.4 F | HEART RATE: 84 BPM | DIASTOLIC BLOOD PRESSURE: 83 MMHG | WEIGHT: 192.2 LBS | SYSTOLIC BLOOD PRESSURE: 119 MMHG | BODY MASS INDEX: 30.89 KG/M2

## 2025-02-06 DIAGNOSIS — R19.7 DIARRHEA: ICD-10-CM

## 2025-02-06 DIAGNOSIS — R10.84 ABDOMINAL PAIN, GENERALIZED: ICD-10-CM

## 2025-02-06 DIAGNOSIS — R58 ECCHYMOSIS: ICD-10-CM

## 2025-02-06 DIAGNOSIS — K52.832 LYMPHOCYTIC COLITIS: ICD-10-CM

## 2025-02-06 PROCEDURE — 99214 OFFICE O/P EST MOD 30 MIN: CPT | Performed by: INTERNAL MEDICINE

## 2025-02-06 RX ORDER — BUDESONIDE 3 MG/1
1 CAPSULES CAPSULE ORAL THREE TIMES A DAY
Qty: 270 CAPSULE | Refills: 1 | OUTPATIENT
Start: 2025-02-06 | End: 2025-08-05

## 2025-02-06 RX ORDER — HYOSCYAMINE SULFATE 0.12 MG/1
1 TABLET AS NEEDED TABLET ORAL THREE TIMES A DAY
Qty: 270 TABLET | Refills: 1 | Status: ACTIVE | COMMUNITY
Start: 2024-01-18 | End: 2025-03-15

## 2025-02-06 RX ORDER — CHOLESTYRAMINE 4 G/9G
1 PACKET MIXED WITH WATER OR NON-CARBONATED DRINK POWDER, FOR SUSPENSION ORAL ONCE A DAY
Qty: 90 | Refills: 1 | Status: ACTIVE | COMMUNITY
Start: 2023-03-09

## 2025-02-06 RX ORDER — FLUTICASONE PROPIONATE 50 UG/1
SPRAY 1 - 2 SPRAYS (50 - 100 MCG) IN EACH NOSTRIL BY INTRANASAL ROUTE ONCE DAILY SPRAY, METERED NASAL 1
Qty: 1 | Refills: 0 | Status: ACTIVE | COMMUNITY
Start: 1900-01-01

## 2025-02-06 RX ORDER — BUDESONIDE 3 MG/1
1 CAPSULES CAPSULE ORAL THREE TIMES A DAY
Qty: 270 CAPSULE | Refills: 1 | Status: ACTIVE | COMMUNITY
Start: 2024-12-03 | End: 2025-06-01

## 2025-02-06 RX ORDER — DICYCLOMINE HYDROCHLORIDE 10 MG/1
1 TABLET CAPSULE ORAL THREE TIMES A DAY
Qty: 270 TABLET | Refills: 1 | Status: ACTIVE | COMMUNITY
Start: 2024-08-01 | End: 2025-07-16

## 2025-02-06 RX ORDER — ROSUVASTATIN CALCIUM 5 MG/1
1 TABLET TABLET, COATED ORAL ONCE A DAY
Status: ACTIVE | COMMUNITY

## 2025-02-06 RX ORDER — BALSALAZIDE DISODIUM 750 MG/1
1 CAPSULE CAPSULE ORAL THREE TIMES A DAY
Qty: 270 CAPSULE | Refills: 2 | Status: ACTIVE | COMMUNITY
Start: 2024-12-31 | End: 2025-09-27

## 2025-02-06 NOTE — HPI-TODAY'S VISIT:
2020 Patient has occasional abdominal cramping. She continues to take Balsalazide, and Bentyl 3 tablets a day. She has been off of Budesonide for the past 1 year. She states soft cheeses trigger her symptoms. Denies any diarrhea or rectal bleeding.  2020  Colonoscopy on 2019 was normal. Biopsies showed no signs of lymphocytic colitis. Patient presents for a follow up office visit. Patient states she was doing well the past 3 months. She had an episode of diarrhea on Monday. She has been consuming milk and dairy products very minimally. She reports improvement on Bentyl, with occasional aching feeling in the stomach. She continued to take Balsalazide, and just ran out yesterday.   2021 Patient presents for a f/u office visit. She says her diarrhea and other symptoms have improved significantly, back to normal. Her symptoms subsided without steroid medication, which she did not take because it was 6 months . She still takes balsalazide 3 times a day, has been taking it for about over a year now. She stopped entocort about 2 years ago. She says the cause of her recent flare up was unknown, possibly stress or food. Her  was recently diagnosed with prostate cancer which contributed to her stress at the time. Denies heartburn or acid reflux symptoms. She mostly avoids milk and dairy products, she is aware of which foods cause her irritation. Entocort caused her skin tears and discoloration in her leg previously, improved since she stopped.   2021 Patient presents for a follow up office visit. Patient reports her diarrhea occurs infrequently now, often triggered by salad and dairy products. She currently takes Balsalazide 750mg twice a day and Dicyclomine as needed. She is not using Entocort. Overall, she is doing well.   2022 Labs from 2021 were normal. Patient reports she is doing well on Balsalazide 750mg twice a day. Diarrhea only presents occasionally now. She has been off of Entocort for over 1.5 years. She admits joint pain in her hands and knee from arthritis. She denies any heartburn or acid reflux.   2022 Patient presents for follow up. Labs on 2022 showed normal kidney function. Patient reports "aching" discomfort in lower abdomen and lower back for the past month. Back pain is newly onset. She had two episodes of nocturnal cramping, improved after having bowel movement. Diarrhea presents occasionally now, significantly improved on Budesonide. She takes Dicyclomine tid, Balsalazide 750mg bid. Labs with her PCP 1 month ago showed low vitamin B12. She has been taking tumeric, has not started vitamin B12 injections yet.  2022 Patient presents for a follow up for lower back and abdominal pain. Patient denies any significant symptoms and she states that she still takes hyoscymine and she will start azathioprine. Patient states that she is also still on the balsalazide. Patient denies any back pain and joint pains. She states that the only time that she has pain in her back is when she bends over and then straightens out. Yet, she states that she is over all feeling a lot better and her pain has improved.  10/13/2022 Patient presents for lower back pain and abdominal pain. Patient states that she feels fine, but that she could not tolerate the azathioprine due to having nausea. She states that she is still taking the balsalazide and hyoscyamine. Patient states that she does not have loose bowels anymore. She denies joint pain and back pain. Patient states that her last colonoscopy was 3 years ago. Patient states that she has arthritis. Patient states that she has been getting b12 shots and that she got blood work done about 3-4 weeks ago.  2022 Patient presents for a follow up on colonoscopy. Patient states that she still takes budesonide three times a day. She states that she is unsure as to when was the last time she had a flare up in her colon. She states that her symptoms got better after switching dicyclomine to hyoscyamine. She denies issues with bloating or constipation and denies dysphagia.  3/9/2023 Patient presents for a 3 month follow up. Patient states that she has been taking the hyoscyamine and that she has not been feeling bloating or abdominal pain. She does state that she has loose bowels, but that she has not started the balsalazide. Patient states that one day she had a smoothie, only fruit and no milk, and that her flare up was very bad. Patient denies anxiety, stress, or depression. She states that the hyoscyamine has helped with the abdominal cramping and the noise.  2023 Patient presents for a follow up. Patient states that she is not on any medication anymore and states that she feels better now. Patient states that when she was taking the questran powder, that it really helped. Patient states that she also stopped taking the balsalazide as well. She states that she only limits her diary intake. She states that she was recently put on rosuvastatin.  2024 Patient presents for a follow up. Patient states that she has only been having loose bowels a couple times and that when it occurs she has intense abdominal cramping. She is still on the questran powder, but she states that she takes it only when she has a flare up of loose bowels and abdominal pain. She is still on hyoscyamine and balsalazide. Overall, patient's symptoms have improved.  2024 Patient presents for a 6 month follow up. Patient denies melena, constipation, bloating, and stomach pain. Patient says that she has been taking the hyoscymine once a day and mentions that when she runs out the insurance wont cover a refill so she has dicicylomine. Patient says that she has stopped the Balsalazide.  2024 Patient presents for follow up. She says she had a flare up of back pain, abdominal pain, and diarrhea during , and says she had stopped balsalazide and hyoscyamine. When symtpoms started back she went back onto the medication. Patient started budesonside tid 9mg  and causes skin tearing, symptoms resolved. 2025 Patient came for follow-up says she is having significant symptoms of flareups she also complains of 2 episodes of multiple days of diarrhea ..  She denies of any joint pains she thinks the blue cheese causes her diarrhea denies of any melena hematochezia.  Denies of any nausea vomiting denies having abdominal pain.  No loss of weight no loss of appetite she does decrease the dose of the Entocort which started her symptoms.

## 2025-02-13 ENCOUNTER — TELEPHONE ENCOUNTER (OUTPATIENT)
Dept: URBAN - METROPOLITAN AREA CLINIC 92 | Facility: CLINIC | Age: 70
End: 2025-02-13

## 2025-04-24 ENCOUNTER — OFFICE VISIT (OUTPATIENT)
Dept: URBAN - METROPOLITAN AREA CLINIC 115 | Facility: CLINIC | Age: 70
End: 2025-04-24

## 2025-04-24 ENCOUNTER — OFFICE VISIT (OUTPATIENT)
Dept: URBAN - METROPOLITAN AREA CLINIC 115 | Facility: CLINIC | Age: 70
End: 2025-04-24
Payer: COMMERCIAL

## 2025-04-24 DIAGNOSIS — R19.7 DIARRHEA: ICD-10-CM

## 2025-04-24 DIAGNOSIS — R10.84 ABDOMINAL PAIN, GENERALIZED: ICD-10-CM

## 2025-04-24 DIAGNOSIS — K52.832 LYMPHOCYTIC COLITIS: ICD-10-CM

## 2025-04-24 DIAGNOSIS — R58 ECCHYMOSIS: ICD-10-CM

## 2025-04-24 PROCEDURE — 99214 OFFICE O/P EST MOD 30 MIN: CPT | Performed by: INTERNAL MEDICINE

## 2025-04-24 RX ORDER — FLUTICASONE PROPIONATE 50 UG/1
SPRAY 1 - 2 SPRAYS (50 - 100 MCG) IN EACH NOSTRIL BY INTRANASAL ROUTE ONCE DAILY SPRAY, METERED NASAL 1
Qty: 1 | Refills: 0 | Status: ACTIVE | COMMUNITY
Start: 1900-01-01

## 2025-04-24 RX ORDER — BALSALAZIDE DISODIUM 750 MG/1
1 CAPSULE CAPSULE ORAL THREE TIMES A DAY
Qty: 270 CAPSULE | Refills: 2 | Status: ACTIVE | COMMUNITY
Start: 2024-12-31 | End: 2025-09-27

## 2025-04-24 RX ORDER — DICYCLOMINE HYDROCHLORIDE 10 MG/1
1 TABLET CAPSULE ORAL THREE TIMES A DAY
Qty: 270 TABLET | Refills: 1 | Status: ACTIVE | COMMUNITY
Start: 2024-08-01 | End: 2025-07-16

## 2025-04-24 RX ORDER — BUDESONIDE 3 MG/1
1 CAPSULES CAPSULE ORAL THREE TIMES A DAY
Qty: 270 CAPSULE | Refills: 1 | Status: ACTIVE | COMMUNITY
Start: 2025-02-06 | End: 2025-08-05

## 2025-04-24 RX ORDER — CHOLESTYRAMINE 4 G/9G
1 PACKET MIXED WITH WATER OR NON-CARBONATED DRINK POWDER, FOR SUSPENSION ORAL ONCE A DAY
Qty: 90 | Refills: 1 | Status: ACTIVE | COMMUNITY
Start: 2023-03-09

## 2025-04-24 RX ORDER — BUDESONIDE 3 MG/1
1 CAPSULES CAPSULE ORAL THREE TIMES A DAY
Qty: 270 CAPSULE | Refills: 1 | Status: ACTIVE | COMMUNITY
Start: 2024-12-03 | End: 2025-06-01

## 2025-04-24 RX ORDER — ROSUVASTATIN CALCIUM 5 MG/1
1 TABLET TABLET, COATED ORAL ONCE A DAY
Status: ACTIVE | COMMUNITY

## 2025-04-24 NOTE — HPI-TODAY'S VISIT:
2020 Patient has occasional abdominal cramping. She continues to take Balsalazide, and Bentyl 3 tablets a day. She has been off of Budesonide for the past 1 year. She states soft cheeses trigger her symptoms. Denies any diarrhea or rectal bleeding.  2020  Colonoscopy on 2019 was normal. Biopsies showed no signs of lymphocytic colitis. Patient presents for a follow up office visit. Patient states she was doing well the past 3 months. She had an episode of diarrhea on Monday. She has been consuming milk and dairy products very minimally. She reports improvement on Bentyl, with occasional aching feeling in the stomach. She continued to take Balsalazide, and just ran out yesterday.   2021 Patient presents for a f/u office visit. She says her diarrhea and other symptoms have improved significantly, back to normal. Her symptoms subsided without steroid medication, which she did not take because it was 6 months . She still takes balsalazide 3 times a day, has been taking it for about over a year now. She stopped entocort about 2 years ago. She says the cause of her recent flare up was unknown, possibly stress or food. Her  was recently diagnosed with prostate cancer which contributed to her stress at the time. Denies heartburn or acid reflux symptoms. She mostly avoids milk and dairy products, she is aware of which foods cause her irritation. Entocort caused her skin tears and discoloration in her leg previously, improved since she stopped.   2021 Patient presents for a follow up office visit. Patient reports her diarrhea occurs infrequently now, often triggered by salad and dairy products. She currently takes Balsalazide 750mg twice a day and Dicyclomine as needed. She is not using Entocort. Overall, she is doing well.   2022 Labs from 2021 were normal. Patient reports she is doing well on Balsalazide 750mg twice a day. Diarrhea only presents occasionally now. She has been off of Entocort for over 1.5 years. She admits joint pain in her hands and knee from arthritis. She denies any heartburn or acid reflux.   2022 Patient presents for follow up. Labs on 2022 showed normal kidney function. Patient reports "aching" discomfort in lower abdomen and lower back for the past month. Back pain is newly onset. She had two episodes of nocturnal cramping, improved after having bowel movement. Diarrhea presents occasionally now, significantly improved on Budesonide. She takes Dicyclomine tid, Balsalazide 750mg bid. Labs with her PCP 1 month ago showed low vitamin B12. She has been taking tumeric, has not started vitamin B12 injections yet.  2022 Patient presents for a follow up for lower back and abdominal pain. Patient denies any significant symptoms and she states that she still takes hyoscymine and she will start azathioprine. Patient states that she is also still on the balsalazide. Patient denies any back pain and joint pains. She states that the only time that she has pain in her back is when she bends over and then straightens out. Yet, she states that she is over all feeling a lot better and her pain has improved.  10/13/2022 Patient presents for lower back pain and abdominal pain. Patient states that she feels fine, but that she could not tolerate the azathioprine due to having nausea. She states that she is still taking the balsalazide and hyoscyamine. Patient states that she does not have loose bowels anymore. She denies joint pain and back pain. Patient states that her last colonoscopy was 3 years ago. Patient states that she has arthritis. Patient states that she has been getting b12 shots and that she got blood work done about 3-4 weeks ago.  2022 Patient presents for a follow up on colonoscopy. Patient states that she still takes budesonide three times a day. She states that she is unsure as to when was the last time she had a flare up in her colon. She states that her symptoms got better after switching dicyclomine to hyoscyamine. She denies issues with bloating or constipation and denies dysphagia.  3/9/2023 Patient presents for a 3 month follow up. Patient states that she has been taking the hyoscyamine and that she has not been feeling bloating or abdominal pain. She does state that she has loose bowels, but that she has not started the balsalazide. Patient states that one day she had a smoothie, only fruit and no milk, and that her flare up was very bad. Patient denies anxiety, stress, or depression. She states that the hyoscyamine has helped with the abdominal cramping and the noise.  2023 Patient presents for a follow up. Patient states that she is not on any medication anymore and states that she feels better now. Patient states that when she was taking the questran powder, that it really helped. Patient states that she also stopped taking the balsalazide as well. She states that she only limits her diary intake. She states that she was recently put on rosuvastatin.  2024 Patient presents for a follow up. Patient states that she has only been having loose bowels a couple times and that when it occurs she has intense abdominal cramping. She is still on the questran powder, but she states that she takes it only when she has a flare up of loose bowels and abdominal pain. She is still on hyoscyamine and balsalazide. Overall, patient's symptoms have improved.  2024 Patient presents for a 6 month follow up. Patient denies melena, constipation, bloating, and stomach pain. Patient says that she has been taking the hyoscymine once a day and mentions that when she runs out the insurance wont cover a refill so she has dicicylomine. Patient says that she has stopped the Balsalazide.  2024 Patient presents for follow up. She says she had a flare up of back pain, abdominal pain, and diarrhea during , and says she had stopped balsalazide and hyoscyamine. When symtpoms started back she went back onto the medication. Patient started budesonside tid 9mg  and causes skin tearing, symptoms resolved. 2025 Patient came for follow-up says she is having significant symptoms of flareups she also complains of 2 episodes of multiple days of diarrhea ..  She denies of any joint pains she thinks the blue cheese causes her diarrhea denies of any melena hematochezia.  Denies of any nausea vomiting denies having abdominal pain.  No loss of weight no loss of appetite she does decrease the dose of the Entocort which started her symptoms.  2025 Patient presents for follow up. Patient has been taking entocort and balsalazide and says her diarrhea has resolved. Patient cannot tolerate the Entocort due to ecchymosis.

## 2025-07-24 ENCOUNTER — TELEPHONE ENCOUNTER (OUTPATIENT)
Dept: URBAN - METROPOLITAN AREA CLINIC 115 | Facility: CLINIC | Age: 70
End: 2025-07-24

## 2025-07-24 ENCOUNTER — OFFICE VISIT (OUTPATIENT)
Dept: URBAN - METROPOLITAN AREA CLINIC 115 | Facility: CLINIC | Age: 70
End: 2025-07-24
Payer: COMMERCIAL

## 2025-07-24 DIAGNOSIS — K52.832 LYMPHOCYTIC COLITIS: ICD-10-CM

## 2025-07-24 DIAGNOSIS — R10.84 ABDOMINAL PAIN, GENERALIZED: ICD-10-CM

## 2025-07-24 DIAGNOSIS — R58 ECCHYMOSIS: ICD-10-CM

## 2025-07-24 DIAGNOSIS — R19.7 DIARRHEA: ICD-10-CM

## 2025-07-24 PROCEDURE — 99214 OFFICE O/P EST MOD 30 MIN: CPT | Performed by: INTERNAL MEDICINE

## 2025-07-24 RX ORDER — CHOLESTYRAMINE 4 G/9G
1 PACKET MIXED WITH WATER OR NON-CARBONATED DRINK POWDER, FOR SUSPENSION ORAL ONCE A DAY
Qty: 90 | Refills: 1 | COMMUNITY
Start: 2023-03-09

## 2025-07-24 RX ORDER — BUDESONIDE 3 MG/1
1 CAPSULES CAPSULE ORAL THREE TIMES A DAY
Qty: 270 CAPSULE | Refills: 1 | COMMUNITY
Start: 2025-02-06 | End: 2025-08-05

## 2025-07-24 RX ORDER — ROSUVASTATIN CALCIUM 5 MG/1
1 TABLET TABLET, COATED ORAL ONCE A DAY
Status: ACTIVE | COMMUNITY

## 2025-07-24 RX ORDER — FLUTICASONE PROPIONATE 50 UG/1
SPRAY 1 - 2 SPRAYS (50 - 100 MCG) IN EACH NOSTRIL BY INTRANASAL ROUTE ONCE DAILY SPRAY, METERED NASAL 1
Qty: 1 | Refills: 0 | COMMUNITY
Start: 1900-01-01

## 2025-07-24 RX ORDER — SODIUM PICOSULFATE, MAGNESIUM OXIDE, AND ANHYDROUS CITRIC ACID 10; 3.5; 12 MG/160ML; G/160ML; G/160ML
160 ML LIQUID ORAL ONCE
Qty: 2 | Refills: 0 | OUTPATIENT
Start: 2025-07-24 | End: 2025-07-25

## 2025-07-24 RX ORDER — BALSALAZIDE DISODIUM 750 MG/1
1 CAPSULE CAPSULE ORAL THREE TIMES A DAY
Qty: 270 CAPSULE | Refills: 2 | OUTPATIENT
Start: 2025-07-24

## 2025-07-24 RX ORDER — BALSALAZIDE DISODIUM 750 MG/1
1 CAPSULE CAPSULE ORAL THREE TIMES A DAY
Qty: 270 CAPSULE | Refills: 2 | Status: ACTIVE | COMMUNITY
Start: 2024-12-31 | End: 2025-09-27

## 2025-07-24 NOTE — HPI-TODAY'S VISIT:
2020 Patient has occasional abdominal cramping. She continues to take Balsalazide, and Bentyl 3 tablets a day. She has been off of Budesonide for the past 1 year. She states soft cheeses trigger her symptoms. Denies any diarrhea or rectal bleeding.  2020  Colonoscopy on 2019 was normal. Biopsies showed no signs of lymphocytic colitis. Patient presents for a follow up office visit. Patient states she was doing well the past 3 months. She had an episode of diarrhea on Monday. She has been consuming milk and dairy products very minimally. She reports improvement on Bentyl, with occasional aching feeling in the stomach. She continued to take Balsalazide, and just ran out yesterday.   2021 Patient presents for a f/u office visit. She says her diarrhea and other symptoms have improved significantly, back to normal. Her symptoms subsided without steroid medication, which she did not take because it was 6 months . She still takes balsalazide 3 times a day, has been taking it for about over a year now. She stopped entocort about 2 years ago. She says the cause of her recent flare up was unknown, possibly stress or food. Her  was recently diagnosed with prostate cancer which contributed to her stress at the time. Denies heartburn or acid reflux symptoms. She mostly avoids milk and dairy products, she is aware of which foods cause her irritation. Entocort caused her skin tears and discoloration in her leg previously, improved since she stopped.   2021 Patient presents for a follow up office visit. Patient reports her diarrhea occurs infrequently now, often triggered by salad and dairy products. She currently takes Balsalazide 750mg twice a day and Dicyclomine as needed. She is not using Entocort. Overall, she is doing well.   2022 Labs from 2021 were normal. Patient reports she is doing well on Balsalazide 750mg twice a day. Diarrhea only presents occasionally now. She has been off of Entocort for over 1.5 years. She admits joint pain in her hands and knee from arthritis. She denies any heartburn or acid reflux.   2022 Patient presents for follow up. Labs on 2022 showed normal kidney function. Patient reports "aching" discomfort in lower abdomen and lower back for the past month. Back pain is newly onset. She had two episodes of nocturnal cramping, improved after having bowel movement. Diarrhea presents occasionally now, significantly improved on Budesonide. She takes Dicyclomine tid, Balsalazide 750mg bid. Labs with her PCP 1 month ago showed low vitamin B12. She has been taking tumeric, has not started vitamin B12 injections yet.  2022 Patient presents for a follow up for lower back and abdominal pain. Patient denies any significant symptoms and she states that she still takes hyoscymine and she will start azathioprine. Patient states that she is also still on the balsalazide. Patient denies any back pain and joint pains. She states that the only time that she has pain in her back is when she bends over and then straightens out. Yet, she states that she is over all feeling a lot better and her pain has improved.  10/13/2022 Patient presents for lower back pain and abdominal pain. Patient states that she feels fine, but that she could not tolerate the azathioprine due to having nausea. She states that she is still taking the balsalazide and hyoscyamine. Patient states that she does not have loose bowels anymore. She denies joint pain and back pain. Patient states that her last colonoscopy was 3 years ago. Patient states that she has arthritis. Patient states that she has been getting b12 shots and that she got blood work done about 3-4 weeks ago.  2022 Patient presents for a follow up on colonoscopy. Patient states that she still takes budesonide three times a day. She states that she is unsure as to when was the last time she had a flare up in her colon. She states that her symptoms got better after switching dicyclomine to hyoscyamine. She denies issues with bloating or constipation and denies dysphagia.  3/9/2023 Patient presents for a 3 month follow up. Patient states that she has been taking the hyoscyamine and that she has not been feeling bloating or abdominal pain. She does state that she has loose bowels, but that she has not started the balsalazide. Patient states that one day she had a smoothie, only fruit and no milk, and that her flare up was very bad. Patient denies anxiety, stress, or depression. She states that the hyoscyamine has helped with the abdominal cramping and the noise.  2023 Patient presents for a follow up. Patient states that she is not on any medication anymore and states that she feels better now. Patient states that when she was taking the questran powder, that it really helped. Patient states that she also stopped taking the balsalazide as well. She states that she only limits her diary intake. She states that she was recently put on rosuvastatin.  2024 Patient presents for a follow up. Patient states that she has only been having loose bowels a couple times and that when it occurs she has intense abdominal cramping. She is still on the questran powder, but she states that she takes it only when she has a flare up of loose bowels and abdominal pain. She is still on hyoscyamine and balsalazide. Overall, patient's symptoms have improved.  2024 Patient presents for a 6 month follow up. Patient denies melena, constipation, bloating, and stomach pain. Patient says that she has been taking the hyoscymine once a day and mentions that when she runs out the insurance wont cover a refill so she has dicicylomine. Patient says that she has stopped the Balsalazide.  2024 Patient presents for follow up. She says she had a flare up of back pain, abdominal pain, and diarrhea during , and says she had stopped balsalazide and hyoscyamine. When symtpoms started back she went back onto the medication. Patient started budesonside tid 9mg  and causes skin tearing, symptoms resolved. 2025 Patient came for follow-up says she is having significant symptoms of flareups she also complains of 2 episodes of multiple days of diarrhea ..  She denies of any joint pains she thinks the blue cheese causes her diarrhea denies of any melena hematochezia.  Denies of any nausea vomiting denies having abdominal pain.  No loss of weight no loss of appetite she does decrease the dose of the Entocort which started her symptoms.  2025 Patient presents for follow up. Patient has been taking entocort and balsalazide and says her diarrhea has resolved. Patient cannot tolerate the Entocort due to ecchymosis.  2025 Patient presents for follow up. Patient mentions she had a bad flare up rrecently that lasted 2 weeks. She took balsalazide and questran to relieve the symptoms of the flare ups. Patient mentions that Clenpiq prep is the method she prefers for the colonoscopy. Endorses mild constipation